# Patient Record
Sex: FEMALE | Race: WHITE | NOT HISPANIC OR LATINO | Employment: OTHER | URBAN - METROPOLITAN AREA
[De-identification: names, ages, dates, MRNs, and addresses within clinical notes are randomized per-mention and may not be internally consistent; named-entity substitution may affect disease eponyms.]

---

## 2017-04-01 ENCOUNTER — OFFICE VISIT - RIVER FALLS (OUTPATIENT)
Dept: FAMILY MEDICINE | Facility: CLINIC | Age: 72
End: 2017-04-01

## 2017-04-03 ENCOUNTER — OFFICE VISIT - RIVER FALLS (OUTPATIENT)
Dept: FAMILY MEDICINE | Facility: CLINIC | Age: 72
End: 2017-04-03

## 2017-04-03 ASSESSMENT — MIFFLIN-ST. JEOR: SCORE: 1152.67

## 2017-04-12 ENCOUNTER — OFFICE VISIT - RIVER FALLS (OUTPATIENT)
Dept: FAMILY MEDICINE | Facility: CLINIC | Age: 72
End: 2017-04-12

## 2017-04-12 ASSESSMENT — MIFFLIN-ST. JEOR: SCORE: 1151.77

## 2017-04-26 ENCOUNTER — OFFICE VISIT - RIVER FALLS (OUTPATIENT)
Dept: FAMILY MEDICINE | Facility: CLINIC | Age: 72
End: 2017-04-26

## 2017-04-26 ASSESSMENT — MIFFLIN-ST. JEOR: SCORE: 1151.77

## 2017-05-15 ENCOUNTER — OFFICE VISIT - RIVER FALLS (OUTPATIENT)
Dept: FAMILY MEDICINE | Facility: CLINIC | Age: 72
End: 2017-05-15

## 2017-05-15 ASSESSMENT — MIFFLIN-ST. JEOR: SCORE: 1154.71

## 2017-09-19 ENCOUNTER — OFFICE VISIT - RIVER FALLS (OUTPATIENT)
Dept: FAMILY MEDICINE | Facility: CLINIC | Age: 72
End: 2017-09-19

## 2017-09-19 ASSESSMENT — MIFFLIN-ST. JEOR: SCORE: 1173.76

## 2017-09-20 LAB
CREAT SERPL-MCNC: 0.91 MG/DL (ref 0.6–0.93)
GLUCOSE BLD-MCNC: 107 MG/DL (ref 65–99)

## 2017-10-09 ENCOUNTER — OFFICE VISIT - RIVER FALLS (OUTPATIENT)
Dept: FAMILY MEDICINE | Facility: CLINIC | Age: 72
End: 2017-10-09

## 2017-10-09 ASSESSMENT — MIFFLIN-ST. JEOR: SCORE: 1177.39

## 2018-10-11 ENCOUNTER — OFFICE VISIT - RIVER FALLS (OUTPATIENT)
Dept: FAMILY MEDICINE | Facility: CLINIC | Age: 73
End: 2018-10-11

## 2018-10-11 ASSESSMENT — MIFFLIN-ST. JEOR: SCORE: 1163.78

## 2018-10-19 ENCOUNTER — OFFICE VISIT - RIVER FALLS (OUTPATIENT)
Dept: FAMILY MEDICINE | Facility: CLINIC | Age: 73
End: 2018-10-19

## 2018-10-19 ASSESSMENT — MIFFLIN-ST. JEOR: SCORE: 1165.6

## 2019-02-19 ENCOUNTER — OFFICE VISIT - RIVER FALLS (OUTPATIENT)
Dept: FAMILY MEDICINE | Facility: CLINIC | Age: 74
End: 2019-02-19

## 2019-02-19 ASSESSMENT — MIFFLIN-ST. JEOR: SCORE: 1187.37

## 2019-11-05 ENCOUNTER — OFFICE VISIT - RIVER FALLS (OUTPATIENT)
Dept: FAMILY MEDICINE | Facility: CLINIC | Age: 74
End: 2019-11-05

## 2019-11-05 ASSESSMENT — MIFFLIN-ST. JEOR: SCORE: 1178.07

## 2019-11-06 ENCOUNTER — COMMUNICATION - RIVER FALLS (OUTPATIENT)
Dept: FAMILY MEDICINE | Facility: CLINIC | Age: 74
End: 2019-11-06

## 2019-11-06 LAB — GLUCOSE BLD-MCNC: 96 MG/DL (ref 65–139)

## 2019-11-12 ENCOUNTER — OFFICE VISIT - RIVER FALLS (OUTPATIENT)
Dept: FAMILY MEDICINE | Facility: CLINIC | Age: 74
End: 2019-11-12

## 2020-03-25 ENCOUNTER — COMMUNICATION - RIVER FALLS (OUTPATIENT)
Dept: FAMILY MEDICINE | Facility: CLINIC | Age: 75
End: 2020-03-25

## 2020-05-05 ENCOUNTER — AMBULATORY - RIVER FALLS (OUTPATIENT)
Dept: FAMILY MEDICINE | Facility: CLINIC | Age: 75
End: 2020-05-05

## 2020-05-05 ENCOUNTER — OFFICE VISIT - RIVER FALLS (OUTPATIENT)
Dept: FAMILY MEDICINE | Facility: CLINIC | Age: 75
End: 2020-05-05

## 2020-05-05 ASSESSMENT — MIFFLIN-ST. JEOR: SCORE: 1181.7

## 2020-05-06 LAB
BUN SERPL-MCNC: 20 MG/DL (ref 7–25)
BUN/CREAT RATIO - HISTORICAL: ABNORMAL (ref 6–22)
C REACTIVE PROTEIN LHE: 7.3 MG/L
CALCIUM SERPL-MCNC: 9.8 MG/DL (ref 8.6–10.4)
CHLORIDE BLD-SCNC: 103 MMOL/L (ref 98–110)
CK SERPL-CCNC: 64 UNIT/L (ref 29–143)
CO2 SERPL-SCNC: 29 MMOL/L (ref 20–32)
CREAT SERPL-MCNC: 0.87 MG/DL (ref 0.6–0.93)
EGFRCR SERPLBLD CKD-EPI 2021: 66 ML/MIN/1.73M2
ERYTHROCYTE [DISTWIDTH] IN BLOOD BY AUTOMATED COUNT: 12.5 % (ref 11–15)
ERYTHROCYTE [SEDIMENTATION RATE] IN BLOOD BY WESTERGREN METHOD: 14 MM/H
GLUCOSE BLD-MCNC: 102 MG/DL (ref 65–99)
HCT VFR BLD AUTO: 37.2 % (ref 35–45)
HGB BLD-MCNC: 13 GM/DL (ref 11.7–15.5)
MCH RBC QN AUTO: 30.9 PG (ref 27–33)
MCHC RBC AUTO-ENTMCNC: 34.9 GM/DL (ref 32–36)
MCV RBC AUTO: 88.4 FL (ref 80–100)
PLATELET # BLD AUTO: 298 10*3/UL (ref 140–400)
PMV BLD: 10.1 FL (ref 7.5–12.5)
POTASSIUM BLD-SCNC: 4 MMOL/L (ref 3.5–5.3)
RBC # BLD AUTO: 4.21 10*6/UL (ref 3.8–5.1)
SODIUM SERPL-SCNC: 138 MMOL/L (ref 135–146)
TSH SERPL DL<=0.005 MIU/L-ACNC: 2.36 MIU/L (ref 0.4–4.5)
WBC # BLD AUTO: 5.8 10*3/UL (ref 3.8–10.8)

## 2020-05-12 ENCOUNTER — COMMUNICATION - RIVER FALLS (OUTPATIENT)
Dept: FAMILY MEDICINE | Facility: CLINIC | Age: 75
End: 2020-05-12

## 2020-05-22 ENCOUNTER — COMMUNICATION - RIVER FALLS (OUTPATIENT)
Dept: FAMILY MEDICINE | Facility: CLINIC | Age: 75
End: 2020-05-22

## 2020-05-28 ENCOUNTER — AMBULATORY - RIVER FALLS (OUTPATIENT)
Dept: FAMILY MEDICINE | Facility: CLINIC | Age: 75
End: 2020-05-28

## 2020-05-29 ENCOUNTER — COMMUNICATION - RIVER FALLS (OUTPATIENT)
Dept: FAMILY MEDICINE | Facility: CLINIC | Age: 75
End: 2020-05-29

## 2020-05-29 LAB — ERYTHROCYTE [SEDIMENTATION RATE] IN BLOOD BY WESTERGREN METHOD: 9 MM/H

## 2020-06-16 ENCOUNTER — COMMUNICATION - RIVER FALLS (OUTPATIENT)
Dept: FAMILY MEDICINE | Facility: CLINIC | Age: 75
End: 2020-06-16

## 2020-06-16 ENCOUNTER — OFFICE VISIT - RIVER FALLS (OUTPATIENT)
Dept: FAMILY MEDICINE | Facility: CLINIC | Age: 75
End: 2020-06-16

## 2020-06-18 ENCOUNTER — OFFICE VISIT - RIVER FALLS (OUTPATIENT)
Dept: FAMILY MEDICINE | Facility: CLINIC | Age: 75
End: 2020-06-18

## 2020-06-18 ASSESSMENT — MIFFLIN-ST. JEOR: SCORE: 1178.07

## 2020-06-20 LAB
A/G RATIO - HISTORICAL: 1.6 (ref 1–2.5)
ALBUMIN SERPL-MCNC: 4.1 GM/DL (ref 3.6–5.1)
ALP SERPL-CCNC: 117 UNIT/L (ref 37–153)
ALT SERPL W P-5'-P-CCNC: 13 UNIT/L (ref 6–29)
ANA SER QL IA: NEGATIVE
AST SERPL W P-5'-P-CCNC: 16 UNIT/L (ref 10–35)
B BURGDOR IGG+IGM SER QL: <0.9
BASOPHILS # BLD MANUAL: 58 10*3/UL (ref 0–200)
BASOPHILS NFR BLD MANUAL: 1.1 %
BILIRUB SERPL-MCNC: 0.6 MG/DL (ref 0.2–1.2)
BUN SERPL-MCNC: 19 MG/DL (ref 7–25)
BUN/CREAT RATIO - HISTORICAL: NORMAL (ref 6–22)
CALCIUM SERPL-MCNC: 10.2 MG/DL (ref 8.6–10.4)
CHLORIDE BLD-SCNC: 103 MMOL/L (ref 98–110)
CO2 SERPL-SCNC: 29 MMOL/L (ref 20–32)
CREAT SERPL-MCNC: 0.78 MG/DL (ref 0.6–0.93)
CYCLIC CITRULLINATED PEPTIDE CCP AB IGG - QUEST: <16
EGFRCR SERPLBLD CKD-EPI 2021: 75 ML/MIN/1.73M2
EOSINOPHIL # BLD MANUAL: 122 10*3/UL (ref 15–500)
EOSINOPHIL NFR BLD MANUAL: 2.3 %
ERYTHROCYTE [DISTWIDTH] IN BLOOD BY AUTOMATED COUNT: 12.9 % (ref 11–15)
GLOBULIN: 2.6 (ref 1.9–3.7)
GLUCOSE BLD-MCNC: 92 MG/DL (ref 65–99)
HCT VFR BLD AUTO: 42.4 % (ref 35–45)
HGB BLD-MCNC: 14.6 GM/DL (ref 11.7–15.5)
LYMPHOCYTES # BLD MANUAL: 1362 10*3/UL (ref 850–3900)
LYMPHOCYTES NFR BLD MANUAL: 25.7 %
MCH RBC QN AUTO: 31.1 PG (ref 27–33)
MCHC RBC AUTO-ENTMCNC: 34.4 GM/DL (ref 32–36)
MCV RBC AUTO: 90.2 FL (ref 80–100)
MONOCYTES # BLD MANUAL: 631 10*3/UL (ref 200–950)
MONOCYTES NFR BLD MANUAL: 11.9 %
NEUTROPHILS # BLD MANUAL: 3127 10*3/UL (ref 1500–7800)
NEUTROPHILS NFR BLD MANUAL: 59 %
PLATELET # BLD AUTO: 264 10*3/UL (ref 140–400)
PMV BLD: 10.3 FL (ref 7.5–12.5)
POTASSIUM BLD-SCNC: 4 MMOL/L (ref 3.5–5.3)
PROT SERPL-MCNC: 6.7 GM/DL (ref 6.1–8.1)
RBC # BLD AUTO: 4.7 10*6/UL (ref 3.8–5.1)
RHEUMATOID FACT SER NEPH-ACNC: <14 IU/ML
SODIUM SERPL-SCNC: 140 MMOL/L (ref 135–146)
WBC # BLD AUTO: 5.3 10*3/UL (ref 3.8–10.8)

## 2020-06-24 ENCOUNTER — COMMUNICATION - RIVER FALLS (OUTPATIENT)
Dept: FAMILY MEDICINE | Facility: CLINIC | Age: 75
End: 2020-06-24

## 2020-06-25 ENCOUNTER — COMMUNICATION - RIVER FALLS (OUTPATIENT)
Dept: FAMILY MEDICINE | Facility: CLINIC | Age: 75
End: 2020-06-25

## 2020-07-01 ENCOUNTER — COMMUNICATION - RIVER FALLS (OUTPATIENT)
Dept: FAMILY MEDICINE | Facility: CLINIC | Age: 75
End: 2020-07-01

## 2020-08-11 ENCOUNTER — COMMUNICATION - RIVER FALLS (OUTPATIENT)
Dept: FAMILY MEDICINE | Facility: CLINIC | Age: 75
End: 2020-08-11

## 2020-11-12 ENCOUNTER — OFFICE VISIT - RIVER FALLS (OUTPATIENT)
Dept: FAMILY MEDICINE | Facility: CLINIC | Age: 75
End: 2020-11-12

## 2021-11-15 ENCOUNTER — TRANSFERRED RECORDS (OUTPATIENT)
Dept: MULTI SPECIALTY CLINIC | Facility: CLINIC | Age: 76
End: 2021-11-15

## 2021-11-15 ENCOUNTER — OFFICE VISIT - RIVER FALLS (OUTPATIENT)
Dept: FAMILY MEDICINE | Facility: CLINIC | Age: 76
End: 2021-11-15

## 2021-11-15 ASSESSMENT — MIFFLIN-ST. JEOR: SCORE: 1178.07

## 2022-02-11 VITALS
SYSTOLIC BLOOD PRESSURE: 128 MMHG | DIASTOLIC BLOOD PRESSURE: 64 MMHG | BODY MASS INDEX: 24.4 KG/M2 | WEIGHT: 151.8 LBS | TEMPERATURE: 98.4 F | HEIGHT: 66 IN | HEART RATE: 68 BPM

## 2022-02-12 VITALS
TEMPERATURE: 98.4 F | BODY MASS INDEX: 24.02 KG/M2 | DIASTOLIC BLOOD PRESSURE: 70 MMHG | SYSTOLIC BLOOD PRESSURE: 160 MMHG | OXYGEN SATURATION: 97 % | HEIGHT: 66 IN | BODY MASS INDEX: 23.78 KG/M2 | HEART RATE: 70 BPM | WEIGHT: 148 LBS | HEIGHT: 66 IN

## 2022-02-12 VITALS
TEMPERATURE: 98.2 F | OXYGEN SATURATION: 99 % | RESPIRATION RATE: 16 BRPM | BODY MASS INDEX: 24.16 KG/M2 | SYSTOLIC BLOOD PRESSURE: 136 MMHG | WEIGHT: 144.6 LBS | HEART RATE: 76 BPM | HEART RATE: 70 BPM | DIASTOLIC BLOOD PRESSURE: 80 MMHG | HEIGHT: 65 IN | HEART RATE: 65 BPM | WEIGHT: 145 LBS | HEIGHT: 65 IN | DIASTOLIC BLOOD PRESSURE: 70 MMHG | TEMPERATURE: 97.2 F | TEMPERATURE: 98.7 F | TEMPERATURE: 98 F | SYSTOLIC BLOOD PRESSURE: 106 MMHG | OXYGEN SATURATION: 98 % | DIASTOLIC BLOOD PRESSURE: 78 MMHG | HEART RATE: 73 BPM | DIASTOLIC BLOOD PRESSURE: 76 MMHG | OXYGEN SATURATION: 99 % | HEIGHT: 65 IN | SYSTOLIC BLOOD PRESSURE: 138 MMHG | BODY MASS INDEX: 23.98 KG/M2 | DIASTOLIC BLOOD PRESSURE: 74 MMHG | OXYGEN SATURATION: 98 % | HEIGHT: 66 IN | HEART RATE: 78 BPM | BODY MASS INDEX: 24.19 KG/M2 | BODY MASS INDEX: 24.16 KG/M2 | WEIGHT: 145.2 LBS | BODY MASS INDEX: 23.24 KG/M2 | SYSTOLIC BLOOD PRESSURE: 120 MMHG | TEMPERATURE: 97.9 F | WEIGHT: 145 LBS | WEIGHT: 145 LBS | SYSTOLIC BLOOD PRESSURE: 140 MMHG

## 2022-02-12 VITALS — BODY MASS INDEX: 24.5 KG/M2 | HEIGHT: 66 IN

## 2022-02-12 VITALS
SYSTOLIC BLOOD PRESSURE: 128 MMHG | DIASTOLIC BLOOD PRESSURE: 70 MMHG | HEIGHT: 66 IN | BODY MASS INDEX: 23.78 KG/M2 | TEMPERATURE: 98.4 F | HEART RATE: 68 BPM | WEIGHT: 148 LBS

## 2022-02-12 VITALS
HEART RATE: 76 BPM | DIASTOLIC BLOOD PRESSURE: 66 MMHG | HEIGHT: 66 IN | BODY MASS INDEX: 24.04 KG/M2 | DIASTOLIC BLOOD PRESSURE: 66 MMHG | SYSTOLIC BLOOD PRESSURE: 100 MMHG | BODY MASS INDEX: 23.91 KG/M2 | TEMPERATURE: 98 F | TEMPERATURE: 98.7 F | WEIGHT: 148.8 LBS | HEART RATE: 68 BPM | WEIGHT: 149.6 LBS | HEIGHT: 66 IN | SYSTOLIC BLOOD PRESSURE: 116 MMHG

## 2022-02-12 VITALS
WEIGHT: 146.6 LBS | BODY MASS INDEX: 23.56 KG/M2 | WEIGHT: 147 LBS | HEART RATE: 72 BPM | SYSTOLIC BLOOD PRESSURE: 136 MMHG | DIASTOLIC BLOOD PRESSURE: 86 MMHG | TEMPERATURE: 99.1 F | HEIGHT: 66 IN | SYSTOLIC BLOOD PRESSURE: 135 MMHG | HEART RATE: 69 BPM | HEIGHT: 66 IN | BODY MASS INDEX: 23.63 KG/M2 | DIASTOLIC BLOOD PRESSURE: 78 MMHG

## 2022-02-12 VITALS
HEIGHT: 66 IN | HEIGHT: 66 IN | SYSTOLIC BLOOD PRESSURE: 126 MMHG | WEIGHT: 148.8 LBS | DIASTOLIC BLOOD PRESSURE: 74 MMHG | HEART RATE: 69 BPM | BODY MASS INDEX: 23.91 KG/M2 | BODY MASS INDEX: 23.89 KG/M2

## 2022-02-12 VITALS
WEIGHT: 148 LBS | HEART RATE: 76 BPM | HEIGHT: 66 IN | TEMPERATURE: 97.6 F | DIASTOLIC BLOOD PRESSURE: 74 MMHG | BODY MASS INDEX: 23.78 KG/M2 | SYSTOLIC BLOOD PRESSURE: 118 MMHG

## 2022-02-16 NOTE — PROGRESS NOTES
Patient:   PETEY AZAR            MRN: [id number]            FIN: [id number]               Age:   71 years     Sex:  Female     :  1945   Associated Diagnoses:   Preoperative clearance; Left inguinal hernia   Author:   Josh Silverman MD      Chief Complaint   2017 1:12 PM CDT    Patient presents today for a pre-op Px for a hernia repair surgery to be done 2017 with Dr. Larios @ [facility].      Preoperative Information   Indication for surgery:  symptomatic inguinal hernia.    Accompanied by:  No one.    Source of history:  Self, Medical record.           Review of Systems   Constitutional:  No fever, No chills, No sweats, No weakness, No fatigue.    Eye:  No recent visual problem.    Ear/Nose/Mouth/Throat:  No decreased hearing, No nasal congestion, No sore throat.    Respiratory:  No shortness of breath, No cough.    Cardiovascular:  Negative, No chest pain, No palpitations, No peripheral edema.    Gastrointestinal:  No nausea, No vomiting, No diarrhea, No constipation, No heartburn.    Genitourinary:  No dysuria, No change in urine stream.    Hematology/Lymphatics:  No bruising tendency, No bleeding tendency.    Endocrine:  No cold intolerance, No heat intolerance.    Immunologic:  Negative.    Musculoskeletal:  No back pain, No neck pain, No joint pain, No muscle pain.    Integumentary:  No rash, No dryness, No skin lesion.    Neurologic:  Alert and oriented X4, No headache.    Psychiatric:  No anxiety, No depression.       Health Status   Allergies:    Allergic Reactions (Selected)  Severity Not Documented  Adhesive tape (Rash)  Cipro (Joint pain)  Doxycycline (Systemic rxn)  Dust (No reactions were documented)  Mold (No reactions were documented)  Pollen (No reactions were documented)  Strawberries (No reactions were documented)  Sulfa drug (Rash and difficulty breathing)   Medications:  (Selected)   Prescriptions  Prescribed  Advair Diskus 100 mcg-50 mcg inhalation powder: 1 puff(s), inh,  bid, # 60 EA, 11 Refill(s), Type: Maintenance, Pharmacy: [facility], 1 puff(s) inh bid  albuterol CFC free 90 mcg/inh inhalation aerosol: 2 puff(s), inh, q4 hrs, # 1 EA, 11 Refill(s), Type: Maintenance, Pharmacy: [facility], 2 puff(s) inh q4 hrs  compression stockings: compression stockings, See Instructions, Instructions: 30-40mm Hg  459.81  chronic venous insuffiency  prevents leg pain and swelling, # 2 EA, 5 Refill(s), Type: Soft Stop  fluticasone 50 mcg/inh nasal spray: 2 spray(s), nasal, daily, # 1 EA, 11 Refill(s), Pharmacy: [facility], 2 spray(s) nasal daily  Documented Medications  Documented  Fish Oil: po, 0 Refill(s), Type: Maintenance  MiraLax: ( 17 gm ), po, daily, 0 Refill(s), Type: Maintenance  Multiple Vitamins oral tablet: 1 tab(s), PO, daily, 0 Refill(s), Type: Soft Stop  PreserVision AREDS 2: 1 cap(s), po, daily, 0 Refill(s), Type: Maintenance   Problem list:    All Problems  Chronic Venous Insufficiency / ICD-9-.81 / Confirmed  Asthma / SNOMED CT 5232585902 / Confirmed  Mild intermittent asthma / SNOMED CT 4884879683 / Confirmed  Osteopenia of the Elderly / ICD-9-.90 / Confirmed  Osteoarthritis of CMC joint of thumb / SNOMED CT 458070510 / Confirmed  Seasonal Allergies / ICD-9-.9 / Confirmed  Resolved: Pregnancy / SNOMED CT 546907528  Resolved: Pregnancy / SNOMED CT 124233035  Resolved: Pregnancy / SNOMED CT 889036496  Resolved: Chicken Pox / ICD-9-.9  Resolved: Tobacco abuse / SNOMED CT 208321786      Histories   Past Medical History:    Active  Chronic Venous Insufficiency (459.81): Onset on 6/25/2012 at 66 years.  Asthma (7624451786)  Osteopenia of the Elderly (733.90)  Seasonal Allergies (477.9)  Resolved  Tobacco abuse (814156627):  Resolved.  Comments:  5/3/2010 CDT 9:31 AM CDT - Doni VILLASENORMira  quit  Pregnancy (210795839):  Resolved in 1972 at 26 years.  Pregnancy (787376634):  Resolved in 1974 at 28  years.  Pregnancy (980642549):  Resolved in  at 34 years.  Chicken Pox (052.9):  Resolved.   Family History:    Dementia  Mother ()  Kidney failure...  Father ()  Sarcoidosis...  Mother ()  Crohns disease...  Daughter (Milady)     Procedure history:    Colonoscopy (SNOMED CT 699399614) on 10/21/2016 at 70 Years.  Comments:  10/24/2016 2:22 PM - Brittany Singh MA  Indication:   screening  Sedation:   Midazolam 3mg, Fentanyl 150mcg--IV  Findings:  no polyps/abnormalities seen  Recommendation:   f/u only as needed.  Cholecystectomy with cholangiography (SNOMED CT 261114447) on 2013 at 67 Years.  Colonoscopy (SNOMED CT 739109938) on 2006 at 60 Years.  Comments:  2013 3:31 PM - Talia Llanes CMA  Procedure done in [place].  Repeat in 3 years with yearly hemoccults.    2012 9:28 AM - Brittany Singh MA  diverticulosis.    rpt 2009  Knee replacement (SNOMED CT 758388633).  venous ligation.  Comments:  2012 4:52 PM - Brittany Singh MA  both legs.   Social History:        Alcohol Assessment            Current, Wine (5 oz), Daily, 1 drinks/episode average.      Tobacco Assessment            Past      Substance Abuse Assessment            Never      Employment and Education Assessment            Retired      Home and Environment Assessment            Marital status: .  Spouse/Partner name: Tomy.  Lives with Spouse.  3 children.      Nutrition and Health Assessment            Type of diet: Regular.      Exercise and Physical Activity Assessment: Regular exercise            Exercise frequency: 3-4 times/week.  Exercise type: Aerobics, Yoga.      Sexual Assessment            Sexually active: No.       Has no history of anemia.  Has no history of DVT or pulmonary embolism.  Has no personal history of bleeding problems.   Has no personal or family history of anesthesia reactions.  Patient does not have active tuberculosis.    S/he has not taken aspirin or aspirin  containing products in the last week.     S/he has not taken Plavix (Clopidogrel) in the last 2 weeks.    S/he has not taken warfarin in the past week.    S/he has not been on corticosteroids for more than 2 weeks recently.      S/he is not DNR before, during or after surgery.    Chest pain / SOB walking up 2 flights of steps:  no  Pain in neck or jaw:  no  CAD MI:  no  Afib:  no  Heart Failure:  no  Asthma  or Bronchitis:  yes  Diabetes:  no       Insulin/Orals   Seizure Disorder:  no  CKD:  no  Thyroid Disease:  no  Liver Disease:  no  CVA:  no         Physical Examination   Vital Signs   4/12/2017 1:12 PM CDT Temperature Tympanic 98.0 DegF    Peripheral Pulse Rate 65 bpm    HR Method Electronic    Systolic Blood Pressure 106 mmHg    Diastolic Blood Pressure 70 mmHg    Mean Arterial Pressure 82 mmHg    BP Site Right arm    BP Method Manual    Oxygen Saturation 99 %      Measurements from flowsheet : Measurements   4/12/2017 1:12 PM CDT Height Measured - Standard 65.2 in    Weight Measured - Standard 145 lb    BSA 1.74 m2    Body Mass Index 23.98 kg/m2      General:  Alert and oriented, No acute distress.    Eye:  Pupils are equal, round and reactive to light, Extraocular movements are intact, Normal conjunctiva.    HENT:  Normocephalic, Tympanic membranes are clear, Oral mucosa is moist, No pharyngeal erythema, No sinus tenderness.    Neck:  Supple, Non-tender, No carotid bruit, No lymphadenopathy, No thyromegaly.    Respiratory:  Lungs are clear to auscultation, Respirations are non-labored, Breath sounds are equal, No chest wall tenderness.    Cardiovascular:  Normal rate, Regular rhythm, No murmur, No gallop, Normal peripheral perfusion, No edema.    Gastrointestinal:  Soft, Non-tender, No organomegaly.    Musculoskeletal:  Normal range of motion, Normal strength, No swelling, No deformity.    Integumentary:  Warm, Dry, No rash.    Neurologic:  Alert, Oriented, No focal deficits.    Psychiatric:  Cooperative,  Appropriate mood & affect.       Review / Management         Results review:  Lab results   4/1/2017 9:28 AM CDT Sodium Level 140 mmol/L    Potassium Level 3.4 mmol/L    Chloride Level 99 mmol/L    CO2 Level 22 mmol/L    AGAP 19    Glucose Level 136 mg/dL    BUN 22 mg/dL    Creatinine Level 0.84 mg/dL    BUN/Creat Ratio 26    eGFR  >60    eGFR Non-African American >60    Calcium Level 10.4 mg/dL    Bilirubin Total 0.8 mg/dL    Bilirubin Direct 0.3 mg/dL    Bilirubin Indirect 0.5 mg/dL    Alkaline Phosphatase 115 IU/L    AST/SGOT 22 IU/L    ALT/SGPT 21 IU/L    Protein Total 7.8 g/dL    Albumin Level 4.6 g/dL    Globulin 3.2 g/dL    A/G Ratio 1.4    WBC 9.8    RBC 5.19    Hgb 15.8 g/dL    Hct 44.9 %    MCV 87 fL    MCH 30.4 pg    MCHC 35.2 g/dL    RDW 12.7 %    Platelet 270    MPV 9.7 fL   .    ECG interpretation:  Within normal limits, Normal sinus rhythm, No ST-T changes.       Impression and Plan   Diagnosis     Preoperative clearance (OGW01-DM Z01.818).     Left inguinal hernia (TKP26-TS K40.90).     Condition:  Stable, The patient is cleared for anesthesia and surgery.

## 2022-02-16 NOTE — PROGRESS NOTES
Patient:   PETEY AZAR            MRN: [id number]            FIN: [id number]               Age:   71 years     Sex:  Female     :  1945   Associated Diagnoses:   Medicare annual wellness visit, subsequent; AK (actinic keratosis); Mild intermittent asthma   Author:   Josh Silverman MD      Visit Information      Date of Service: 10/09/2017 10:49 am  Performing Location: [facility]  Encounter#: [id number]      Primary Care Provider (PCP):  Josh Silverman MD    NPI# [id number]   Visit type:  Medicare, annual wellness visit.    Accompanied by:  No one.    Source of history:  Self.    History limitation:  None.       Chief Complaint   10/9/2017 10:51 AM CDT   AWV, flu shot.        Well Adult History   Well Adult History             The patient presents for Medicare well exam.  The patient's general health status is described as good.  The patient's diet is described as balanced.  Exercise: occasional.  Associated symptoms consist of none.  Last menstrual period: menopausal.  Medical encounters:.  Complaint:.  Additional pertinent history: daily caffeine use, tobacco use none and occasional alcohol use.       mammogram: 16  Pap smear:  n/a  colonoscopy:  up to date   lipid and diabetes screening:    immunizations:  up to date   other:  needs update on asthma, may be travelling to [place] with the [place]      Review of Systems   Constitutional:  No fever, No chills, No sweats, No weakness, No fatigue.    Eye:  No recent visual problem.    Ear/Nose/Mouth/Throat:  No decreased hearing, No nasal congestion, No sore throat.    Respiratory:  No shortness of breath, No cough.    Cardiovascular:  Negative, No chest pain, No palpitations, No peripheral edema.    Gastrointestinal:  No nausea, No vomiting, No diarrhea, No constipation, No heartburn.    Genitourinary:  No dysuria, No change in urine stream.    Hematology/Lymphatics:  No bruising tendency, No bleeding tendency.    Endocrine:   No cold intolerance, No heat intolerance.    Immunologic:  Negative.    Musculoskeletal:  No back pain, No neck pain, No joint pain, No muscle pain.    Integumentary:  Rash, No dryness, No skin lesion.    Neurologic:  Alert and oriented X4,   Cognitive impairment: none  Year: [place]  Date:  [place]  [place]    Psychiatric:  No anxiety, No depression.    PHQ9 and CAGE reviewed and discussed with patient.      Hearing impairment:  no  ADL: independent  Fall risk:  low  Home safety:  [place]    Advanced care planning:  completed      Health Status   Allergies:    Allergic Reactions (Selected)  Severity Not Documented  Adhesive tape (Rash)  Cipro (Joint pain)  Doxycycline (Systemic rxn)  Dust (No reactions were documented)  Mold (No reactions were documented)  Pollen (No reactions were documented)  Strawberries (No reactions were documented)  Sulfa drug (Rash and difficulty breathing)   Medications:  (Selected)   Prescriptions  Prescribed  Advair Diskus 100 mcg-50 mcg inhalation powder: 1 puff(s), inh, bid, # 1 EA, 3 Refill(s), Type: Soft Stop, Pharmacy: [facility], 1 puff(s) inh bid  ProAir HFA 90 mcg/inh inhalation aerosol: See Instructions, Instructions: INHALE 2 PUFFS BY MOUTH EVERY 4 HOURS, # 8.5 unknown unit, Type: Soft Stop, Pharmacy: [facility], INHALE 2 PUFFS BY MOUTH EVERY 4 HOURS  compression stockings: compression stockings, See Instructions, Instructions: 30-40mm Hg  459.81  chronic venous insuffiency  prevents leg pain and swelling, # 2 EA, 5 Refill(s), Type: Soft Stop  fluticasone 50 mcg/inh nasal spray: 2 spray(s), nasal, daily, # 1 EA, 11 Refill(s), Pharmacy: [facility], 2 spray(s) nasal daily  Documented Medications  Documented  Fish Oil: po, 0 Refill(s), Type: Maintenance  MiraLax: ( 17 gm ), po, daily, 0 Refill(s), Type: Maintenance  Multiple Vitamins oral tablet: 1 tab(s), PO, daily, 0 Refill(s), Type: Soft Stop  PreserVision AREDS 2: 1 cap(s), po, daily,  0 Refill(s), Type: Maintenance   Problem list:    All Problems  Chronic Venous Insufficiency / ICD-9-.81 / Confirmed  Asthma / SNOMED CT 4591024853 / Confirmed  Mild intermittent asthma / SNOMED CT 6013987998 / Confirmed  Osteopenia of the Elderly / ICD-9-.90 / Confirmed  Osteoarthritis of CMC joint of thumb / SNOMED CT 541995492 / Confirmed  Seasonal Allergies / ICD-9-.9 / Confirmed  Resolved: Pregnancy / SNOMED CT 528920734  Resolved: Pregnancy / SNOMED CT 322932381  Resolved: Pregnancy / SNOMED CT 224952112  Resolved: Chicken Pox / ICD-9-.9  Resolved: Tobacco abuse / SNOMED CT 440464871     Other Healthcare:         Histories   Past Medical History:    Active  Chronic Venous Insufficiency (459.81): Onset on 2012 at 66 years.  Asthma (2820196793)  Osteopenia of the Elderly (733.90)  Seasonal Allergies (477.9)  Resolved  Tobacco abuse (384570700):  Resolved.  Comments:  5/3/2010 CDT 9:31 AM CDT - Mira Marion CMA  quit  Pregnancy (767734691):  Resolved in  at 26 years.  Pregnancy (157629761):  Resolved in  at 28 years.  Pregnancy (454698399):  Resolved in  at 34 years.  Chicken Pox (052.9):  Resolved.   Family History:    Dementia  Mother ()  Kidney failure...  Father ()  Sarcoidosis...  Mother ()  Crohns disease...  Daughter (Milady)     Procedure history:    Extracapsular cataract extraction and insertion of intraocular lens (SNOMED CT 627637350) on 2017 at 71 Years.  Comments:  10/6/2017 2:55 PM - Char Sharma.  Colonoscopy (SNOMED CT 408003517) on 10/21/2016 at 70 Years.  Comments:  10/24/2016 2:22 PM - Francisco GOLDBERG, Brittany  Indication:   screening  Sedation:   Midazolam 3mg, Fentanyl 150mcg--IV  Findings:  no polyps/abnormalities seen  Recommendation:   f/u only as needed.  Cholecystectomy with cholangiography (SNOMED CT 857691174) on 2013 at 67 Years.  Colonoscopy (SNOMED CT 704912395) on 2006 at 60  Years.  Comments:  12/19/2013 3:31 PM - Talia Llanes CMA  Procedure done in [place].  Repeat in 3 years with yearly hemoccults.    6/25/2012 9:28 AM - Brittany Singh MA  diverticulosis.    rpt 02/2009  Hernia repair (SNOMED CT 34703132) on 7/1/1994 at 48 Years.  Comments:  6/7/2013 12:42 PM - Brittany Rey  Right femoral hernia  Tonsillectomy (SNOMED CT 738050496).  Breast biopsy and related procedures (SNOMED CT 745042347).  Comments:  5/3/2010 9:28 AM - Mira Wu  benign  cyst removal.  Knee replacement (SNOMED CT 413662354).  venous ligation.  Comments:  6/25/2012 4:52 PM - Brittany Singh MA  both legs.   Social History:        Alcohol Assessment            Current, Wine (5 oz), Daily, 1 drinks/episode average.      Tobacco Assessment            Past      Substance Abuse Assessment            Never      Employment and Education Assessment            Retired      Home and Environment Assessment            Marital status: .  Spouse/Partner name: Tomy.  Lives with Spouse.  3 children.      Nutrition and Health Assessment            Type of diet: Regular.      Exercise and Physical Activity Assessment: Regular exercise            Exercise frequency: 3-4 times/week.  Exercise type: Aerobics, Yoga.      Sexual Assessment            Sexually active: No.        Physical Examination   Vital Signs   10/9/2017 10:51 AM CDT Temperature Tympanic 98 DegF    Peripheral Pulse Rate 68 bpm    Pulse Site Radial artery    HR Method Manual    Systolic Blood Pressure 116 mmHg    Diastolic Blood Pressure 66 mmHg    Mean Arterial Pressure 83 mmHg    BP Site Right arm    BP Method Manual      Measurements from flowsheet : Measurements   10/9/2017 10:51 AM CDT Height Measured - Standard 65.5 in    Weight Measured - Standard 149.6 lb    BSA 1.77 m2    Body Mass Index 24.51 kg/m2      General:  Alert and oriented, No acute distress.    Eye:  Pupils are equal, round and reactive to light, Extraocular movements are intact,  Normal conjunctiva.    HENT:  Normocephalic, Tympanic membranes are clear, Oral mucosa is moist, No pharyngeal erythema, No sinus tenderness.    Neck:  Supple, Non-tender, No carotid bruit, No lymphadenopathy, No thyromegaly.    Respiratory:  Lungs are clear to auscultation, Respirations are non-labored, Breath sounds are equal, No chest wall tenderness.    Cardiovascular:  Normal rate, Regular rhythm, No murmur, No gallop, Normal peripheral perfusion, No edema.    Breast:  No mass, No tenderness, No discharge.    Gastrointestinal:  Soft, Non-tender, Normal bowel sounds, No organomegaly.    Genitourinary:  No costovertebral angle tenderness.    Musculoskeletal:  Normal range of motion, Normal strength, No swelling, No deformity, 3 mm erythematous, irritated lesion on right temple.    Integumentary:  Warm, Dry, No rash.    Neurologic:  Alert, Oriented, Normal sensory, Normal motor function, No focal deficits, Normal deep tendon reflexes.    Psychiatric:  Cooperative, Appropriate mood & affect.       Review / Management   Results review      Impression and Plan   Diagnosis     Medicare annual wellness visit, subsequent (RIK24-ST Z09).     AK (actinic keratosis) (XPY51-EG L57.0).     Mild intermittent asthma (YNW32-MS J45.20).     Course:  Progressing as expected.    Plan:  Counseled on health maintenance, diet, activity, BMI discussed  azithromycin for travel.         Procedure: lesion was treated with a 30 second freeze with ice ball extending 1 mm beyond margin of lesion.    Patient Instructions:       Counseled: Regarding diagnosis, Regarding medications, Smoking cessation, Verbalized understanding, Breast cancer, colon cancer, diabetes, lipid, HTN, obesity screening discussed and initiated, Risk factors for development of chronic disease and infirmities of ageing have been discussed and plans for minimizing and screening for these conditions have been discussed.  Plans in place for management of conditions  identified.  The preventative screening checklist has been completed and reviewed with the patient and a copy has been filled in the electronic record..    Orders     Orders (Selected)   Outpatient Orders  Completed  influenza virus vaccine, inactivated high-dose preservative-free trivalent intramuscular suspension...: 0.5 mL, im, once  Prescriptions  Prescribed  azithromycin 250 mg oral tablet: 1 tab, PO, Daily, x 5 day(s), Instructions: Take 2 today then 1 daily for 4 days, # 6 tab(s), 0 Refill(s), Type: Acute, Pharmacy: [facility] [place], 1 tab po daily,x5 day(s),Instr:Take 2 today then 1 daily for 4 days  fluticasone 50 mcg/inh nasal spray: 2 spray(s), nasal, daily, # 1 EA, 11 Refill(s), Pharmacy: [facility] [place], 2 spray(s) nasal daily.

## 2022-02-16 NOTE — NURSING NOTE
Quick Intake Entered On:  5/5/2020 3:51 PM CDT    Performed On:  5/5/2020 3:47 PM CDT by Lashon Hummel RN               Summary   Chief Complaint :   Recheck BP   Advance Directive :   Yes   Menstrual Status :   Postmenopausal   Height Measured :   66 in(Converted to: 5 ft 6 in, 167.64 cm)    Systolic Blood Pressure :   126 mmHg   Diastolic Blood Pressure :   74 mmHg   Mean Arterial Pressure :   91 mmHg   Peripheral Pulse Rate :   69 bpm   BP Site :   Right arm   Pulse Site :   Brachial artery   BP Method :   Electronic   HR Method :   Electronic   Lashon Hummel RN - 5/5/2020 3:51 PM CDT

## 2022-02-16 NOTE — PROGRESS NOTES
Chief Complaint    [facility]  History of Present Illness      General health status:  good      Diet:  heart healthy      Exercise:  walking      Medical encounters:  none      Dental exam:        Eye exam:  this year      Caffeine use:  daily      Tobacco use:  no      Alcohol use:  daily      Lipid and diabetes screenin      Colon cancer screening:  n/a      Immunizations:  up to date      Other concerns:  bone density      Asthma is well controlled      She has weaned off prednisone for PMR         Review of Systems          Constitutional:  No fever, No chills, No sweats, No weakness, No fatigue.            Eye:  No recent visual problem.            Ear/Nose/Mouth/Throat:  No decreased hearing, No nasal congestion, No sore throat.            Respiratory:  No shortness of breath, No cough.            Cardiovascular:  Peripheral edema, No chest pain, No palpitations.            Gastrointestinal:  No nausea, No vomiting, No diarrhea, No constipation, No heartburn.            Genitourinary:  No dysuria, No change in urine stream.            Hematology/Lymphatics:  No bruising tendency, No bleeding tendency.            Endocrine:  No cold intolerance, No heat intolerance.            Immunologic:  Negative.            Musculoskeletal:  No back pain, No neck pain, No joint pain, No muscle pain.            Integumentary:  Dryness, Skin lesion, No rash.            Neurologic:  Alert and oriented X4, No headache.          Cognitive impairment:  nook          Year:  ok          Date:  ok          City: [place]           Psychiatric:  No anxiety, No depression.                       PHQ9 and CAGE reviewed and discussed with patient.                       Hearing:  passed          Vision:  passed          ADL: no concerns, completely independent          Fall risk: none          Home safety:  no concerns                     Advanced care planning:  completed   Physical Exam   Vitals & Measurements    T: 97.6  F  (Tympanic)  HR: 76 (Peripheral)  BP: 118/74     HT: 66 in  WT: 148 lb  BMI: 23.89           General:  Alert and oriented, No acute distress.            Eye:  Normal conjunctiva, Vision unchanged.            HENT:  Normocephalic, Tympanic membranes are clear, Oral mucosa is moist, No pharyngeal erythema.            Neck:  Supple, Non-tender, No carotid bruit, No lymphadenopathy, No thyromegaly.            Respiratory:  Lungs are clear to auscultation, Respirations are non-labored.            Cardiovascular:  Normal rate, Regular rhythm, Normal peripheral perfusion.            Gastrointestinal:  Soft, Non-tender.            Genitourinary:  No costovertebral angle tenderness.            Musculoskeletal:  Normal range of motion, Normal strength.            Integumentary:  Warm, Dry, No rash.            Neurologic:  Alert, Oriented.            Psychiatric:  Cooperative, Appropriate mood & affect.    Assessment/Plan       1. Medicare annual wellness visit, subsequent (Z09)        Discussed diet, weight management, BMI, activity and exercise        Follow up in one year        Risk factors for development of chronic disease and infirmities of ageing have been discussed and plans for minimizing and screening for these conditions have been discussed.  Plans in place for management of conditions identified.  The preventive services checklist has been reviewed with the patient and a copy has been placed in the electronic record.  Prostate cancer, colon cancer, diabetes, lipid, HTN, obesity screening discussed and initiated                2. Mild intermittent asthma (J45.20)         controlled, continue current medication                3. Osteoporosis (M81.0)         check DEXA, continue current medication         Ordered:          DEXA Scan (Request), Instructions: RFA[facility], Osteoporosis                Orders:         albuterol, 2 puff(s), Inhale, q4 hrs, PRN: as needed for wheezing, # 1 EA, 3 Refill(s), Type: Hard Stop,  [facility], [facility], 11/12/20 11:03:00 CST, Height Measured, 148, lb, 06/18/20 11:17:00 CDT, Weight Measured, (Completed)         albuterol, 2 puff(s), Inhale, q4 hrs, PRN: as needed for wheezing, # 1 EA, 3 Refill(s), Type: Soft Stop, Pharmacy: [facility] 2 puff(s) Inhale q4 hrs,PRN:as needed for wheezing, 66, in, 11/15/21 14:29:00 CST, Height Measured, 148, lb, 11/15..., (Ordered)         alendronate, = 1 tab(s) ( 70 mg ), Oral, qweek, Instructions: with 6-8 oz plain water, at least 30 minutes before first food, beverage, or medication of the day, # 13 tab(s), 3 Refill(s), Type: Maintenance, Pharmacy: [facility]), 1 tab(s) Oral..., (Ordered)         alendronate, = 1 tab(s) ( 70 mg ), Oral, qweek, Instructions: with 6-8 oz plain water, at least 30 minutes before first food, beverage, or medication of the day, # 13 tab(s), 3 Refill(s), Type: Hard Stop, [facility], 66, in, 11/12/20 1..., (Completed)         fluticasone nasal, = 2 spray(s), Nasal, daily, # 3 EA, 3 Refill(s), Type: Maintenance, Pharmacy: [facility]), 2 spray(s) Nasal daily, 66, in, 11/15/21 14:29:00 CST, Height Measured, 148, lb, 11/15/21 14:29:00 CST, Weight Measured, (Ordered)         fluticasone nasal, = 2 spray(s), Nasal, daily, # 3 EA, 3 Refill(s), Type: Hard Stop, [facility], 66, in, 11/12/20 11:03:00 CST, Height Measured, 148, lb, 06/18/20 11:17:00 CDT, Weight Measured, (Completed)         fluticasone-salmeterol, See Instructions, Instructions: INHALE 1 PUFF TWICE A DAY, # 3 EA, 3 Refill(s), Type: Hard Stop, Pharmacy: [facility], 66, in, 11/12/20 11:03:00 CST, Height Measured, 148, lb, 06/18/20 11:17:00 CDT, Weight Measured, (Completed)         fluticasone-salmeterol, See Instructions, Instructions: INHALE 1 PUFF TWICE A DAY, # 3 EA, 3 Refill(s), Type: Maintenance, Pharmacy: [facility] INHALE 1 PUFF TWICE A DAY, 66, in, 11/15/21 14:29:00 CST, Height Measured, 148, lb, 11/15/21 14:29:00 CST,..., (Ordered)         Return to Clinic (Request), RFV: Yearly exam/physical, Return in 1 year         Return to Clinic (Request), RFV: AWV, Return in 1 year  Patient Information     Name:PETEY AZAR      Address:      [address] [place] [id number]     Sex:Female     YOB: 1945     [phone]     Emergency Contact:RELL AZAR     MRN:[id number]     FIN:[id number]     Location:[facility]     Date of Service:11/15/2021      Primary Care Physician:       Josh Silverman MD, [phone]      Attending Physician:       Josh Silverman MD, [phone]  Problem List/Past Medical History    Ongoing     Asthma     Chronic venous insufficiency     Mild intermittent asthma     Osteoarthritis of CMC joint of thumb     Osteoporosis     Seasonal allergies    Historical     Chicken pox     Pregnancy     Pregnancy     Pregnancy     Tobacco abuse       Comments: quit  Procedure/Surgical History     Extracapsular cataract extraction and insertion of intraocular lens (10/05/2017)      Comments: Left..     Extracapsular cataract extraction and insertion of intraocular lens (09/21/2017)      Comments: Right..     Colonoscopy (10/21/2016)      Comments: Indication:   screening      Sedation:   Midazolam 3mg, Fentanyl 150mcg--IV      Findings:  no polyps/abnormalities seen      Recommendation:   f/u only as needed..     Cholecystectomy with cholangiography (06/11/2013)     Colonoscopy (02/08/2006)      Comments: diverticulosis.    rpt 02/2009. Procedure done in [place].  Repeat in 3 years with yearly hemoccults..     Hernia repair (07/01/1994)      Comments: Right femoral hernia.     Breast biopsy and related procedures      Comments: benign.     cyst removal     Knee replacement     Tonsillectomy     venous ligation      Comments: both legs..  Medications     Advair Diskus 100 mcg-50 mcg inhalation powder, See Instructions, 3 refills    alendronate 70 mg oral tablet, 70 mg= 1 tab(s), Oral, qweek, 3 refills    calcium (as carbonate) 600 mg oral tablet, 1200 mg= 2 tab(s), Oral, daily    compression stockings, See Instructions, 5 refills    Fish Oil, Oral    fluticasone 50 mcg/inh nasal spray, 2 spray(s), Nasal, daily, 3 refills    MiraLax, 17 gm, Oral, daily    Multiple Vitamins oral tablet, 1 tab(s), Oral, daily    PreserVision AREDS 2, 1 cap(s), Oral, daily    ProAir HFA 90 mcg/inh inhalation aerosol, 2 puff(s), Inhale, q4 hrs, PRN, 3 refills  Allergies    Cipro (joint pain)    Dust    Mold    Pollen    Strawberries    adhesive tape (Rash)    doxycycline (systemic rxn)    sulfa drug (Difficulty breathing, Rash)  Social History    Smoking Status     Never smoker     Alcohol - Current      Wine (5 oz), Daily, 1 drinks/episode average. Ready to change: No.     Electronic Cigarette/Vaping      Electronic Cigarette Use: Never.     Employment/School      Retired, Highest education level: Master's.     Exercise      Exercise frequency: 3-4 times/week. Exercise type: Aerobics, Yoga.     Home/Environment      Marital status: . Spouse/Partner name: Tomy. Lives with Spouse. 3 children. Living situation: Home/Independent. Injuries/Abuse/Neglect in household: No. Feels unsafe at home: No. Family/Friends available for support: Yes.     Nutrition/Health      Type of diet: Regular. Wants to lose weight: No. Sleeping concerns: No. Feels highly stressed: No.     Sexual      Sexually active: No. Identifies as female     Substance Abuse - Denies Substance Abuse      Never     Tobacco - Past      Quit in 1 982, Cigarettes, 4 per day. Total pack years: 15.  Family History    Alcohol abuse: Brother and Grandparent.    Alcoholism: Father.    Allergic rhinitis: Mother.    Allergy: Mother and Father.    Alzheimer's disease: Mother.    Arthritis: Mother and Father.    Asthma: Mother and  Father.    Breast cancer: Grandfather (P) and Grandmother (P).    CA - Cancer of colon: Father.    Crohns disease...: Daughter.    Dementia: Mother.    Diabetes mellitus: Father.    Heart disease: Grandfather (M).    Kidney failure...: Father.    Sarcoidosis...: Mother.    Sister: History is unknown    Sister: History is unknown  Immunizations       Scheduled Immunizations       Dose Date(s)       influenza virus vaccine, inactivated       10/22/2013, 10/01/2019, 10/21/2013, 09/10/2020, 10/05/2021       pneumococcal (PPSV23)       10/03/2011       SARS-CoV-2 (COVID-19) Moderna-1273       01/19/2021, 02/16/2021, 10/23/2021       tetanus/diphth/pertuss (Tdap) adult/adol       10/09/2017       zoster vaccine, inactivated       04/15/2019, 06/14/2019       Other Immunizations               Hep A       11/01/1998, 07/01/1999       typhoid, inactivated       11/01/1998, 04/27/2009, 02/10/2014, 02/19/2019       yellow fever       11/01/1998       influenza virus vaccine, inactivated       09/20/2010, 10/03/2011, 10/14/2014, 10/19/2015, 09/21/2016, 10/09/2017, 10/11/2018       tetanus/diphth/pertuss (Tdap) adult/adol       04/21/2008       ZOS, shingles       04/27/2009       influenza, H1N1, inactivated       11/03/2009       pneumococcal (PCV13)       10/14/2014

## 2022-02-16 NOTE — LETTER
(Inserted Image. Unable to display)                                                                                                [address] [facility] [address]                                                July 09, 2020Re: PETEY [id number]  [facility] [address] [place] [id number]:  [facility] following [facility] has been referred to your office/practice:  PETEY DOE Appointment:  Appointment is PendingPlease refer to the attached  clinical documentation for a summary of PETEY's care.  Please do not hesitate to contact our office if any additional clinical questions arise.  All relevant records and transition of care documents should be mailed or faxed.Your assistance in providing continuity of care is appreciated. Sincerely, [facility] [place] [place] [address](P) [phone](F) [phone]

## 2022-02-16 NOTE — TELEPHONE ENCOUNTER
---------------------  From: Josh Silverman MD   To: PETEY AZAR    Sent: 5/29/2020 9:37:20 AM CDT  Subject: Patient Message - Results Notification        Your test is in the normal range.  If symptoms return after the current round of prednisone is finished I advise a clinic appointment.    Results:  Date Result Name Value Ref Range   5/28/2020 2:41 PM Sed Rate 9 ( - < OR = 30)

## 2022-02-16 NOTE — TELEPHONE ENCOUNTER
---------------------  From: Venice Heard LPN (Phone Messages Pool ([id number]_[facility]))   To: LILIA Message Pool ([id number]_[place] [facility]);     Sent: 6/16/2020 9:05:16 AM CDT  Subject: FW: Message for Dr. Silverman             ---------------------  From: PETEY AZAR  To: [facility]  Sent: 06/16/2020 08:58 a.m. CDT  Subject: Message for Dr. Silverman  Symptoms have returned after prednisone finished.  As you suggested, i will call for an appointment.---------------------  From: Venice Heard LPN (eRx Pool ([id number]_[facility]))   To: Josh Silverman MD;     Sent: 6/16/2020 9:06:20 AM CDT  Subject: FW: Message for Dr. Silverman     Patient has a video visit with LILIA at 2:00 today.

## 2022-02-16 NOTE — NURSING NOTE
Comprehensive Intake Entered On:  6/16/2020 1:51 PM CDT    Performed On:  6/16/2020 1:44 PM CDT by Little Mcmahon CMA               Summary   Chief Complaint :   Patient c/o ongoing pain in neck and upper arms. Also, hip and thigh pain. States the Prednisone helped. Verbal consent granted for video visit.   Advance Directive :   Yes   Menstrual Status :   Postmenopausal   Height Measured :   66 in(Converted to: 5 ft 6 in, 167.64 cm)    Little Mcmahon CMA - 6/16/2020 1:44 PM CDT   Health Status   Allergies Verified? :   Yes   Medication History Verified? :   Yes   Immunizations Current :   Yes   Medical History Verified? :   Yes   Pre-Visit Planning Status :   Completed   Tobacco Use? :   Never smoker   Little Mcmahon CMA - 6/16/2020 1:44 PM CDT   Consents   Consent for Immunization Exchange :   Consent Granted   Consent for Immunizations to Providers :   Consent Granted   Little Mcmahon CMA - 6/16/2020 1:44 PM CDT   Meds / Allergies   (As Of: 6/16/2020 1:51:33 PM CDT)   Allergies (Active)   adhesive tape  Estimated Onset Date:   Unspecified ; Reactions:   Rash ; Created By:   Brittany Rey; Reaction Status:   Active ; Category:   Drug ; Substance:   adhesive tape ; Type:   Allergy ; Updated By:   Brittany Rey; Reviewed Date:   6/16/2020 1:46 PM CDT      Cipro  Estimated Onset Date:   Unspecified ; Reactions:   joint pain ; Created By:   Brittany Singh MA; Reaction Status:   Active ; Category:   Drug ; Substance:   Cipro ; Type:   Allergy ; Updated By:   Brittany Singh MA; Source:   Patient ; Reviewed Date:   6/16/2020 1:46 PM CDT      doxycycline  Estimated Onset Date:   Unspecified ; Reactions:   systemic rxn ; Created By:   Brittany Singh MA; Reaction Status:   Active ; Category:   Drug ; Substance:   doxycycline ; Type:   Allergy ; Updated By:   Brittany Singh MA; Source:   Patient ; Reviewed Date:   6/16/2020 1:46 PM CDT      Dust  Estimated Onset Date:   Unspecified ; Created By:   Brittany Rey;  Reaction Status:   Active ; Category:   Environment ; Substance:   Dust ; Type:   Allergy ; Updated By:   Brittany Rey; Reviewed Date:   6/16/2020 1:46 PM CDT      Mold  Estimated Onset Date:   Unspecified ; Created By:   Brittany Rey; Reaction Status:   Active ; Category:   Environment ; Substance:   Mold ; Type:   Allergy ; Updated By:   Brittany Rey; Reviewed Date:   6/16/2020 1:46 PM CDT      Pollen  Estimated Onset Date:   Unspecified ; Created By:   Brittany Rey; Reaction Status:   Active ; Category:   Environment ; Substance:   Pollen ; Type:   Allergy ; Updated By:   Brittany Rey; Reviewed Date:   6/16/2020 1:46 PM CDT      Strawberries  Estimated Onset Date:   Unspecified ; Created By:   Brittany Rey; Reaction Status:   Active ; Category:   Food ; Substance:   Strawberries ; Type:   Allergy ; Updated By:   Brittany Rey; Reviewed Date:   6/16/2020 1:46 PM CDT      sulfa drug  Estimated Onset Date:   Unspecified ; Reactions:   Rash, Difficulty breathing ; Created By:   Brittany Rey; Reaction Status:   Active ; Category:   Drug ; Substance:   sulfa drug ; Type:   Allergy ; Updated By:   Brittany Rey; Source:   Paper Chart ; Reviewed Date:   6/16/2020 1:46 PM CDT        Medication List   (As Of: 6/16/2020 1:51:33 PM CDT)   Prescription/Discharge Order    albuterol  :   albuterol ; Status:   Prescribed ; Ordered As Mnemonic:   ProAir HFA 90 mcg/inh inhalation aerosol ; Simple Display Line:   2 puff(s), Inhale, q4 hrs, PRN: as needed for wheezing, 1 EA, 11 Refill(s) ; Ordering Provider:   Josh Silverman MD; Catalog Code:   albuterol ; Order Dt/Tm:   11/5/2019 3:29:53 PM CST          fluticasone-salmeterol  :   fluticasone-salmeterol ; Status:   Prescribed ; Ordered As Mnemonic:   Advair Diskus 100 mcg-50 mcg inhalation powder ; Simple Display Line:   1 puff(s), inh, bid, 1 EA, 11 Refill(s) ; Ordering Provider:   Josh Silverman MD; Catalog Code:   fluticasone-salmeterol ; Order Dt/Tm:    11/5/2019 3:30:00 PM CST          fluticasone nasal  :   fluticasone nasal ; Status:   Prescribed ; Ordered As Mnemonic:   Flonase 50 mcg/inh nasal spray ; Simple Display Line:   100 mcg, 2 spray(s), Nasal, daily, 16 gm, 11 Refill(s) ; Ordering Provider:   Josh Silverman MD; Catalog Code:   fluticasone nasal ; Order Dt/Tm:   11/5/2019 3:29:52 PM CST          Miscellaneous Rx Supply  :   Miscellaneous Rx Supply ; Status:   Prescribed ; Ordered As Mnemonic:   compression stockings ; Simple Display Line:   See Instructions, 30-40mm Hg  459.81  chronic venous insuffiency  prevents leg pain and swelling, 2 EA ; Ordering Provider:   Josh Silverman MD; Catalog Code:   Miscellaneous Rx Supply ; Order Dt/Tm:   6/25/2012 2:41:39 PM CDT            Home Meds    calcium carbonate  :   calcium carbonate ; Status:   Documented ; Ordered As Mnemonic:   calcium (as carbonate) 600 mg oral tablet ; Simple Display Line:   1,200 mg, 2 tab(s), Oral, daily, 0 Refill(s) ; Catalog Code:   calcium carbonate ; Order Dt/Tm:   6/16/2020 1:46:54 PM CDT          multivitamin  :   multivitamin ; Status:   Documented ; Ordered As Mnemonic:   Multiple Vitamins oral tablet ; Simple Display Line:   1 tab(s), PO, daily ; Catalog Code:   multivitamin ; Order Dt/Tm:   6/25/2012 2:16:21 PM CDT          multivitamin with minerals  :   multivitamin with minerals ; Status:   Documented ; Ordered As Mnemonic:   PreserVision AREDS 2 ; Simple Display Line:   1 cap(s), po, daily, 0 Refill(s) ; Catalog Code:   multivitamin with minerals ; Order Dt/Tm:   7/14/2016 9:23:00 AM CDT          omega-3 polyunsaturated fatty acids  :   omega-3 polyunsaturated fatty acids ; Status:   Documented ; Ordered As Mnemonic:   Fish Oil ; Simple Display Line:   po, 0 Refill(s) ; Catalog Code:   omega-3 polyunsaturated fatty acids ; Order Dt/Tm:   9/15/2015 3:07:24 PM CDT          polyethylene glycol 3350  :   polyethylene glycol 3350 ; Status:   Documented ; Ordered As  Mnemonic:   MiraLax ; Simple Display Line:   17 gm, po, daily ; Catalog Code:   polyethylene glycol 3350 ; Order Dt/Tm:   10/3/2011 11:54:08 AM CDT            ID Risk Screen   Recent Travel History :   No recent travel   Family Member Travel History :   No recent travel   Other Exposure to Infectious Disease :   Unknown   Little Mcmahon CMA - 6/16/2020 1:44 PM CDT

## 2022-02-16 NOTE — PROGRESS NOTES
Patient:   PETEY AZAR            MRN: [id number]            FIN: [id number]               Age:   71 years     Sex:  Female     :  1945   Associated Diagnoses:   Abdominal pain; Nausea   Author:   Emma Argueta      Visit Information      Date of Service: 2017 08:24 am  Performing Location: [facility]  Encounter#: [id number]      Primary Care Provider (PCP):  Josh Silverman MD    NPI# [id number]      Referring Provider:  No referring provider recorded for selected visit.      Chief Complaint   2017 8:37 AM CDT     PT c/o severe abdominal pain started lower area and moving up since last night..  Pt has vomiting        History of Present Illness   Confirmed symptoms and concerns with patient as presented in CC above. Onset of symptoms after supper, she and  had fish and he is ok. No fever. Very nauseated and pain is low mid abdomen. She has not been constipated but took a couple ducolax a few hours ago in case that was the problem. She had a similar episode of this pain about 1 yr with no known etiology. She has has had no fever,no respiratory symptoms, no chest pain or SOB , no pain meds taken. She felt fine yesterday until this episode. No blood in stool, stools have been normal. she did have a screening colonoscopy in the past 3 months and it was landon, no diverticulosis      Health Status   Allergies:    Allergic Reactions (Selected)  Severity Not Documented  Adhesive tape (Rash)  Cipro (Joint pain)  Doxycycline (Systemic rxn)  Dust (No reactions were documented)  Mold (No reactions were documented)  Pollen (No reactions were documented)  Strawberries (No reactions were documented)  Sulfa drug (Rash and difficulty breathing)   Medications:  (Selected)   Prescriptions  Prescribed  Advair Diskus 100 mcg-50 mcg inhalation powder: 1 puff(s), inh, bid, # 60 EA, 11 Refill(s), Type: Maintenance, Pharmacy: [facility] [place], 1 puff(s) inh bid  Zofran 4 mg oral  tablet: 1 tab(s) ( 4 mg ), PO, q8 hrs, Instructions: dispense SL formulation if covered by insurance--pt can choose, # 5 tab(s), 0 Refill(s), Type: Maintenance, Pharmacy: [facility], 1 tab(s) po q8 hrs,Instr:dispense SL formulation if cover...  albuterol CFC free 90 mcg/inh inhalation aerosol: 2 puff(s), inh, q4 hrs, # 1 EA, 11 Refill(s), Type: Maintenance, Pharmacy: [facility], 2 puff(s) inh q4 hrs  compression stockings: compression stockings, See Instructions, Instructions: 30-40mm Hg  459.81  chronic venous insuffiency  prevents leg pain and swelling, # 2 EA, 5 Refill(s), Type: Soft Stop  fluticasone 50 mcg/inh nasal spray: 2 spray(s), nasal, daily, # 1 EA, 11 Refill(s), Pharmacy: [facility], 2 spray(s) nasal daily  ketorolac 10 mg oral tablet: 1 tab(s) ( 10 mg ), PO, q6 hrs, PRN: for pain, # 5 tab(s), 0 Refill(s), Type: Maintenance, Pharmacy: [facility], 1 tab(s) po q6 hrs,PRN:for pain  Documented Medications  Documented  Fish Oil: po, 0 Refill(s), Type: Maintenance  MiraLax: ( 17 gm ), po, daily, 0 Refill(s), Type: Maintenance  Multiple Vitamins oral tablet: 1 tab(s), PO, daily, 0 Refill(s), Type: Soft Stop  PreserVision AREDS 2: 1 cap(s), po, daily, 0 Refill(s), Type: Maintenance  aspirin 81 mg oral tablet: 1 tab(s) ( 81 mg ), po, daily, tab(s), 0 Refill(s), Type: Maintenance   Problem list:    All Problems  Asthma / SNOMED CT 3057846289 / Confirmed  Mild intermittent asthma / SNOMED CT 0902330356 / Confirmed  Osteoarthritis of CMC joint of thumb / SNOMED CT 468274117 / Confirmed  Chronic Venous Insufficiency / ICD-9-.81 / Confirmed  Seasonal Allergies / ICD-9-.9 / Confirmed  Osteopenia of the Elderly / ICD-9-.90 / Confirmed  Resolved: Chicken Pox / ICD-9-.9  Resolved: Tobacco abuse / SNOMED CT 670125421  quit  Resolved: Pregnancy / SNOMED CT 249341366  Resolved: Pregnancy / SNOMED CT 720131700  Resolved:  Pregnancy / SNOMED CT 684997233      Histories   Past Medical History:    Active  Chronic Venous Insufficiency (459.81): Onset on 2012 at 66 years.  Asthma (0545141639)  Osteopenia of the Elderly (733.90)  Seasonal Allergies (477.9)  Resolved  Tobacco abuse (083513735):  Resolved.  Comments:  5/3/2010 CDT 9:31 AM CDT - Mira Marion CMA  quit  Pregnancy (410009351):  Resolved in  at 26 years.  Pregnancy (121081816):  Resolved in  at 28 years.  Pregnancy (124290462):  Resolved in  at 34 years.  Chicken Pox (052.9):  Resolved.   Family History:    Dementia  Mother ()  Kidney failure...  Father ()  Sarcoidosis...  Mother ()  Crohns disease...  Daughter (Milady)     Procedure history:    Colonoscopy (SNOMED CT 386790289) performed by Josh Wheeler MD on 10/21/2016 at 70 Years.  Comments:  10/24/2016 2:22 PM - Brittany Singh MA  Indication:   screening  Sedation:   Midazolam 3mg, Fentanyl 150mcg--IV  Findings:  no polyps/abnormalities seen  Recommendation:   f/u only as needed.  Cholecystectomy with cholangiography (SNOMED CT 187830371) performed by Saul Magallanes on 2013 at 67 Years.  Colonoscopy (SNOMED CT 720640431) on 2006 at 60 Years.  Comments:  2013 3:31 PM - Talia Llanes CMA  Procedure done in [place].  Repeat in 3 years with yearly hemoccults.    2012 9:28 AM - Brittany Singh MA  diverticulosis.    rpt 2009  Hernia repair (SNOMED CT 43748554) on 1994 at 48 Years.  Comments:  2013 12:42 PM - Brittany Rey  Right femoral hernia  Tonsillectomy (SNOMED CT 590986682).  Breast biopsy and related procedures (SNOMED CT 440532216).  Comments:  5/3/2010 9:28 AM - Mira Wu  benign  cyst removal.  Knee replacement (SNOMED CT 181401675).  venous ligation.  Comments:  2012 4:52 PM - Feyereisen MA, Brittany  both legs.   Social History:        Alcohol Assessment            Current, Wine (5 oz), Daily, 1 drinks/episode  average.      Tobacco Assessment            Past      Substance Abuse Assessment            Never      Employment and Education Assessment            Retired      Home and Environment Assessment            Marital status: .  Spouse/Partner name: Tomy.  Lives with Spouse.  3 children.      Nutrition and Health Assessment            Type of diet: Regular.      Exercise and Physical Activity Assessment: Regular exercise            Exercise frequency: 3-4 times/week.  Exercise type: Aerobics, Yoga.      Sexual Assessment            Sexually active: No.        Physical Examination   Vital Signs   4/1/2017 8:37 AM CDT Temperature Tympanic 97.2 DegF  LOW    Peripheral Pulse Rate 78 bpm    HR Method Electronic    Systolic Blood Pressure 136 mmHg    Diastolic Blood Pressure 80 mmHg    Mean Arterial Pressure 99 mmHg    BP Site Right arm    BP Method Manual    Oxygen Saturation 99 %      Measurements from flowsheet : Measurements   4/1/2017 8:37 AM CDT     Weight Measured - Standard                145 lb     General:  Alert and oriented, Mild distress, carrying a bowl in case she vomits.    Eye:  Normal conjunctiva.    HENT:  Tympanic membranes are clear, Normal hearing, Oral mucosa is moist, No pharyngeal erythema, No sinus tenderness.    Neck:  Supple, Non-tender, No lymphadenopathy.    Respiratory:  Lungs are clear to auscultation, Respirations are non-labored, Breath sounds are equal, Symmetrical chest wall expansion.    Cardiovascular:  Normal rate, Regular rhythm, No murmur.    Gastrointestinal:  Soft, Non-distended, Normal bowel sounds, No organomegaly, mild low and left sided tenderness.    Musculoskeletal:  Normal range of motion, Normal gait.    Integumentary:  Warm, Dry, Pink, No rash.    Neurologic:  Alert, Oriented.    Psychiatric:  Cooperative.       Review / Management   Results review:  Lab results   4/1/2017 9:28 AM CDT Sodium Level 140 mmol/L    Potassium Level 3.4 mmol/L    Chloride Level 99 mmol/L     CO2 Level 22 mmol/L    AGAP 19    Glucose Level 136 mg/dL    BUN 22 mg/dL    Creatinine Level 0.84 mg/dL    BUN/Creat Ratio 26    eGFR  >60    eGFR Non-African American >60    Calcium Level 10.4 mg/dL    Bilirubin Total 0.8 mg/dL    Bilirubin Direct 0.3 mg/dL    Bilirubin Indirect 0.5 mg/dL    Alkaline Phosphatase 115 IU/L    AST/SGOT 22 IU/L    ALT/SGPT 21 IU/L    Protein Total 7.8 g/dL    Albumin Level 4.6 g/dL    Globulin 3.2 g/dL    A/G Ratio 1.4    WBC 9.8    RBC 5.19    Hgb 15.8 g/dL    Hct 44.9 %    MCV 87 fL    MCH 30.4 pg    MCHC 35.2 g/dL    RDW 12.7 %    Platelet 270    MPV 9.7 fL   .       Impression and Plan   Diagnosis     Abdominal pain (DAU39-OK R10.9).     Nausea (UZS43-TG R11.0).     Course:  Improving, gien toradol and zofran and felt remarkably improved.    Patient Instructions:       Counseled: Patient, Regarding diagnosis, Regarding treatment, Regarding medications, Verbalized understanding, clear liquids 24 hours, if doing well can slowly advance  If any worsening she should rtc and consider CT scan  She and her  are comfortable with this plan.    Orders     Orders (Selected)   Prescriptions  Prescribed  Zofran 4 mg oral tablet: 1 tab(s) ( 4 mg ), PO, q8 hrs, Instructions: dispense SL formulation if covered by insurance--pt can choose, # 5 tab(s), 0 Refill(s), Type: Maintenance, Pharmacy: [facility] [place],  tab(s) po q8 hrs,Instr:dispense SL formulation if cover...  ketorolac 10 mg oral tablet: 1 tab(s) ( 10 mg ), PO, q6 hrs, PRN: for pain, # 5 tab(s), 0 Refill(s), Type: Maintenance, Pharmacy: [facility] [place], 1 tab(s) po q6 hrs,PRN:for pain.

## 2022-02-16 NOTE — TELEPHONE ENCOUNTER
---------------------  From: Octavio Mobley CMAssica (Phone Messages Pool ([address] [place] [address]))   To: [facility] Message Pool ([address] [place] [address]);     Sent: 8/11/2020 3:24:52 PM CDT  Subject: FW: Rx renew Dr. Herrera Lange           ---------------------  From: PETEY AZAR  To: [facility]  Sent: 08/11/2020 03:23 p.m. CDT  Subject: Rx renew Dr. Felix, Dr. Herrera Felix prescribed 5mg tablets prednisone for my shoulder and hip pain and suggested I see a rheumatologist.  The first appointment i could get is for Nov. 11.  I am taking 7.5 mg daily and it is working fairly well.  I have no refills left, and will need to continue until Nov 11.  I will call the pharmacy to refill and assume that it will be approved unless I hear otherwise.  Thanks very much.  Petey Azar---------------------  From: Suzie Barksdale ([facility] Message Pool ([address] [place] [address]))   To: Jan Felix MD;     Sent: 8/11/2020 4:11:11 PM CDT  Subject: FW: Rx renew Dr. Herrera Lange---------------------  From: Jan Felix MD   To: [facility] Message Pool ([address] [place] [address]);     Sent: 8/11/2020 4:13:57 PM CDT  Subject: RE: Rx renew Dr. Herrera Lange     OK to refill for 4 months.---------------------  From: Little Mcmahon CMA ([facility] Message Pool ([address] [place] [address]))   To: PETEY AZAR    Sent: 8/11/2020 5:07:23 PM CDT  Subject: RE: Rx renew Dr. Herrera Lange,    Your prescription has been approved and sent.    Take care,  Little MCMILLAN, CMA

## 2022-02-16 NOTE — NURSING NOTE
Asthma Control Test (ACT) Total Entered On:  3/25/2020 4:07 PM CDT    Performed On:  3/25/2020 4:07 PM CDT by Brittany Singh MA               Asthma Control Test (ACT) Total   Asthma Control Test Total (Adult) :   18    Brittany Singh MA - 3/25/2020 4:07 PM CDT   Consent (Temporal Branch)/Introductory Paragraph: The rationale for Mohs was explained to the patient and consent was obtained. The risks, benefits and alternatives to therapy were discussed in detail. Specifically, the risks of damage to the temporal branch of the facial nerve, infection, scarring, bleeding, prolonged wound healing, incomplete removal, allergy to anesthesia, and recurrence were addressed. Prior to the procedure, the treatment site was clearly identified and confirmed by the patient. All components of Universal Protocol/PAUSE Rule completed.

## 2022-02-16 NOTE — PROGRESS NOTES
Patient:   PETEY AZAR            MRN: [id number]            FIN: [id number]               Age:   71 years     Sex:  Female     :  1945   Associated Diagnoses:   Acute maxillary sinusitis   Author:   Josh Silverman MD      Visit Information      Date of Service: 05/15/2017 02:25 pm  Performing Location: [facility]  Encounter#: [id number]      Chief Complaint   5/15/2017 2:30 PM CDT    c/o ongoing cold x9days and getting worse.  has loose cough, sinus congestion, thick/yellow nasal discharge.        History of Present Illness             The patient presents with sinus problem.  The sinus problem is located in the maxillary sinus.  The sinus problem is characterized by nasal congestion, rhinorrhea, headache and facial pain.  The sinus problem is constant and is worsening.  The sinus problem has lasted for 9 day(s).  The context of the sinus problem: occurred in association with illness.  Exacerbating factors consist of movement and turning head.  Associated symptoms consist of fever, cough and sore throat.        Review of Systems   All other systems were reviewed and are negative.      Health Status   Allergies:    Allergic Reactions (Selected)  Severity Not Documented  Adhesive tape (Rash)  Cipro (Joint pain)  Doxycycline (Systemic rxn)  Dust (No reactions were documented)  Mold (No reactions were documented)  Pollen (No reactions were documented)  Strawberries (No reactions were documented)  Sulfa drug (Rash and difficulty breathing)   Medications:  (Selected)   Prescriptions  Prescribed  Advair Diskus 100 mcg-50 mcg inhalation powder: 1 puff(s), inh, bid, # 60 EA, 11 Refill(s), Type: Maintenance, Pharmacy: [facility] [place], 1 puff(s) inh bid  albuterol CFC free 90 mcg/inh inhalation aerosol: 2 puff(s), inh, q4 hrs, # 1 EA, 11 Refill(s), Type: Maintenance, Pharmacy: [facility] [place], 2 puff(s) inh q4 hrs  compression stockings: compression stockings, See  Instructions, Instructions: 30-40mm Hg  459.81  chronic venous insuffiency  prevents leg pain and swelling, # 2 EA, 5 Refill(s), Type: Soft Stop  fluticasone 50 mcg/inh nasal spray: 2 spray(s), nasal, daily, # 1 EA, 11 Refill(s), Pharmacy: [facility] [place], 2 spray(s) nasal daily  Documented Medications  Documented  Fish Oil: po, 0 Refill(s), Type: Maintenance  MiraLax: ( 17 gm ), po, daily, 0 Refill(s), Type: Maintenance  Multiple Vitamins oral tablet: 1 tab(s), PO, daily, 0 Refill(s), Type: Soft Stop  PreserVision AREDS 2: 1 cap(s), po, daily, 0 Refill(s), Type: Maintenance   Problem list:    All Problems (Selected)  Chronic Venous Insufficiency / ICD-9-.81 / Confirmed  Asthma / SNOMED CT 8630034168 / Confirmed  Mild intermittent asthma / SNOMED CT 0938078196 / Confirmed  Osteopenia of the Elderly / ICD-9-.90 / Confirmed  Osteoarthritis of CMC joint of thumb / SNOMED CT 156954519 / Confirmed  Seasonal Allergies / ICD-9-.9 / Confirmed      Histories   Past Medical History:    Active  Chronic Venous Insufficiency (459.81): Onset on 6/25/2012 at 66 years.  Asthma (5613382925)  Osteopenia of the Elderly (733.90)  Seasonal Allergies (477.9)  Resolved  Tobacco abuse (187940808):  Resolved.  Comments:  5/3/2010 CDT 9:31 AM CDT - Mira Marion CMA  quit  Pregnancy (111264353):  Resolved in 1972 at 26 years.  Pregnancy (273073008):  Resolved in 1974 at 28 years.  Pregnancy (315724049):  Resolved in 1980 at 34 years.  Chicken Pox (052.9):  Resolved.      Physical Examination   Vital Signs   5/15/2017 2:30 PM CDT Temperature Tympanic 98.2 DegF    Peripheral Pulse Rate 70 bpm    Pulse Site Radial artery    HR Method Electronic    Systolic Blood Pressure 140 mmHg    Diastolic Blood Pressure 76 mmHg    Mean Arterial Pressure 97 mmHg    BP Site Right arm    BP Method Manual    Oxygen Saturation 98 %      Measurements from flowsheet : Measurements   5/15/2017 2:30 PM CDT Height Measured -  Standard 65.5 in    Weight Measured - Standard 144.6 lb    BSA 1.74 m2    Body Mass Index 23.69 kg/m2      General:  Alert and oriented, No acute distress.    Eye:  Normal conjunctiva.    HENT:  Normocephalic.         Sinus: Bilateral, Maxillary sinus, Tenderness.    Neck:  Supple, Non-tender.    Respiratory:  Lungs are clear to auscultation, Respirations are non-labored.    Cardiovascular:  Normal rate, Regular rhythm.    Psychiatric:  Cooperative, Appropriate mood & affect.       Impression and Plan   Diagnosis     Acute maxillary sinusitis (BLO72-UC J01.00).     Course:  Worsening.    Plan:  symptomatic care and follow up if not improving, amox/clav.    Orders     Orders   Pharmacy:  amoxicillin-clavulanate 875 mg-125 mg oral tablet (Prescribe): 1 tab(s), PO, q12hr, x 10 day(s), # 20 tab(s), 0 Refill(s), Type: Acute, Pharmacy: [facility] [place], 1 tab(s) po q12 hrs,x10 day(s).

## 2022-02-16 NOTE — TELEPHONE ENCOUNTER
Date: 11/16/21  Time:1:30pm   Person/Entity Released To/Fax #: [facility] @ FAX: [phone] PHONE: [phone]  Reason: DEXA SCAN  Information Released: ORDER, DEMOGRAPHICS, AND INSURANCE.  Released by: Raine Hassan

## 2022-02-16 NOTE — NURSING NOTE
Quick Intake Entered On:  5/5/2020 3:51 PM CDT    Performed On:  5/5/2020 3:41 PM CDT by Lashon Hummel RN               Summary   Chief Complaint :   Vitals   Advance Directive :   Yes   Menstrual Status :   Postmenopausal   Weight Measured :   148.8 lb(Converted to: 148 lb 13 oz, 67.49 kg)    Height Measured :   66 in(Converted to: 5 ft 6 in, 167.64 cm)    Body Mass Index :   24.01 kg/m2   Body Surface Area :   1.77 m2   Systolic Blood Pressure :   157 mmHg (HI)    Diastolic Blood Pressure :   75 mmHg   Mean Arterial Pressure :   102 mmHg   Peripheral Pulse Rate :   68 bpm   BP Site :   Right arm   Pulse Site :   Brachial artery   BP Method :   Electronic   HR Method :   Electronic   Lashon Hummel RN - 5/5/2020 3:50 PM CDT

## 2022-02-16 NOTE — NURSING NOTE
Comprehensive Intake Entered On:  6/18/2020 11:22 AM CDT    Performed On:  6/18/2020 11:17 AM CDT by Suzie Barksdale               Summary   Chief Complaint :   Pain in upper arm/hips since may. Pain consult per GTG.    Advance Directive :   Yes   Menstrual Status :   Postmenopausal   Weight Measured :   148 lb(Converted to: 148 lb 0 oz, 67.13 kg)    Height Measured :   66 in(Converted to: 5 ft 6 in, 167.64 cm)    Body Mass Index :   23.89 kg/m2   Body Surface Area :   1.77 m2   Systolic Blood Pressure :   160 mmHg (HI)    Diastolic Blood Pressure :   70 mmHg   Mean Arterial Pressure :   100 mmHg   Peripheral Pulse Rate :   70 bpm   BP Site :   Right arm   BP Method :   Manual   HR Method :   Electronic   Temperature Tympanic :   98.4 DegF(Converted to: 36.9 DegC)    Oxygen Saturation :   97 %   Suzie Barksdale - 6/18/2020 11:17 AM CDT   Health Status   Allergies Verified? :   Yes   Medication History Verified? :   Yes   Immunizations Current :   Yes   Medical History Verified? :   Yes   Pre-Visit Planning Status :   N/A   Tobacco Use? :   Never smoker   Suzie Barksdale - 6/18/2020 11:17 AM CDT   Consents   Consent for Immunization Exchange :   Consent Granted   Consent for Immunizations to Providers :   Consent Granted   Suzie Barksdale - 6/18/2020 11:17 AM CDT   Meds / Allergies   (As Of: 6/18/2020 11:22:26 AM CDT)   Allergies (Active)   adhesive tape  Estimated Onset Date:   Unspecified ; Reactions:   Rash ; Created By:   Brittany Rey; Reaction Status:   Active ; Category:   Drug ; Substance:   adhesive tape ; Type:   Allergy ; Updated By:   Brittany Rey; Reviewed Date:   6/18/2020 11:20 AM CDT      Cipro  Estimated Onset Date:   Unspecified ; Reactions:   joint pain ; Created By:   Brittany Singh MA; Reaction Status:   Active ; Category:   Drug ; Substance:   Cipro ; Type:   Allergy ; Updated By:   Brittany Singh MA; Source:   Patient ; Reviewed Date:   6/18/2020 11:20 AM CDT      doxycycline   Estimated Onset Date:   Unspecified ; Reactions:   systemic rxn ; Created By:   Brittany Singh MA; Reaction Status:   Active ; Category:   Drug ; Substance:   doxycycline ; Type:   Allergy ; Updated By:   Brittany Singh MA; Source:   Patient ; Reviewed Date:   6/18/2020 11:20 AM CDT      Dust  Estimated Onset Date:   Unspecified ; Created By:   Brittany Rey; Reaction Status:   Active ; Category:   Environment ; Substance:   Dust ; Type:   Allergy ; Updated By:   Brittany Rey; Reviewed Date:   6/18/2020 11:20 AM CDT      Mold  Estimated Onset Date:   Unspecified ; Created By:   Brittany Rey; Reaction Status:   Active ; Category:   Environment ; Substance:   Mold ; Type:   Allergy ; Updated By:   Brittany Rey; Reviewed Date:   6/18/2020 11:20 AM CDT      Pollen  Estimated Onset Date:   Unspecified ; Created By:   Brittany Rey; Reaction Status:   Active ; Category:   Environment ; Substance:   Pollen ; Type:   Allergy ; Updated By:   Brittany Rey; Reviewed Date:   6/18/2020 11:20 AM CDT      Strawberries  Estimated Onset Date:   Unspecified ; Created By:   Brittany Rey; Reaction Status:   Active ; Category:   Food ; Substance:   Strawberries ; Type:   Allergy ; Updated By:   Brittany Rey; Reviewed Date:   6/18/2020 11:20 AM CDT      sulfa drug  Estimated Onset Date:   Unspecified ; Reactions:   Rash, Difficulty breathing ; Created By:   Brittany Rey; Reaction Status:   Active ; Category:   Drug ; Substance:   sulfa drug ; Type:   Allergy ; Updated By:   Brittany Rey; Source:   Paper Chart ; Reviewed Date:   6/18/2020 11:20 AM CDT        Medication List   (As Of: 6/18/2020 11:22:26 AM CDT)   Prescription/Discharge Order    fluticasone-salmeterol  :   fluticasone-salmeterol ; Status:   Prescribed ; Ordered As Mnemonic:   Advair Diskus 100 mcg-50 mcg inhalation powder ; Simple Display Line:   1 puff(s), inh, bid, 1 EA, 11 Refill(s) ; Ordering Provider:   Josh Silverman MD; Catalog Code:    fluticasone-salmeterol ; Order Dt/Tm:   11/5/2019 3:30:00 PM CST          albuterol  :   albuterol ; Status:   Prescribed ; Ordered As Mnemonic:   ProAir HFA 90 mcg/inh inhalation aerosol ; Simple Display Line:   2 puff(s), Inhale, q4 hrs, PRN: as needed for wheezing, 1 EA, 11 Refill(s) ; Ordering Provider:   Josh Silverman MD; Catalog Code:   albuterol ; Order Dt/Tm:   11/5/2019 3:29:53 PM CST          fluticasone nasal  :   fluticasone nasal ; Status:   Prescribed ; Ordered As Mnemonic:   Flonase 50 mcg/inh nasal spray ; Simple Display Line:   100 mcg, 2 spray(s), Nasal, daily, 16 gm, 11 Refill(s) ; Ordering Provider:   Josh Silverman MD; Catalog Code:   fluticasone nasal ; Order Dt/Tm:   11/5/2019 3:29:52 PM CST          Miscellaneous Rx Supply  :   Miscellaneous Rx Supply ; Status:   Prescribed ; Ordered As Mnemonic:   compression stockings ; Simple Display Line:   See Instructions, 30-40mm Hg  459.81  chronic venous insuffiency  prevents leg pain and swelling, 2 EA ; Ordering Provider:   Josh Silverman MD; Catalog Code:   Miscellaneous Rx Supply ; Order Dt/Tm:   6/25/2012 2:41:39 PM CDT            Home Meds    calcium carbonate  :   calcium carbonate ; Status:   Documented ; Ordered As Mnemonic:   calcium (as carbonate) 600 mg oral tablet ; Simple Display Line:   1,200 mg, 2 tab(s), Oral, daily, 0 Refill(s) ; Catalog Code:   calcium carbonate ; Order Dt/Tm:   6/16/2020 1:46:54 PM CDT          multivitamin with minerals  :   multivitamin with minerals ; Status:   Documented ; Ordered As Mnemonic:   PreserVision AREDS 2 ; Simple Display Line:   1 cap(s), po, daily, 0 Refill(s) ; Catalog Code:   multivitamin with minerals ; Order Dt/Tm:   7/14/2016 9:23:00 AM CDT          omega-3 polyunsaturated fatty acids  :   omega-3 polyunsaturated fatty acids ; Status:   Documented ; Ordered As Mnemonic:   Fish Oil ; Simple Display Line:   po, 0 Refill(s) ; Catalog Code:   omega-3 polyunsaturated fatty acids ;  Order Dt/Tm:   9/15/2015 3:07:24 PM CDT          multivitamin  :   multivitamin ; Status:   Documented ; Ordered As Mnemonic:   Multiple Vitamins oral tablet ; Simple Display Line:   1 tab(s), PO, daily ; Catalog Code:   multivitamin ; Order Dt/Tm:   6/25/2012 2:16:21 PM CDT          polyethylene glycol 3350  :   polyethylene glycol 3350 ; Status:   Documented ; Ordered As Mnemonic:   MiraLax ; Simple Display Line:   17 gm, po, daily ; Catalog Code:   polyethylene glycol 3350 ; Order Dt/Tm:   10/3/2011 11:54:08 AM CDT            ID Risk Screen   Recent Travel History :   No recent travel   Family Member Travel History :   No recent travel   Other Exposure to Infectious Disease :   Unknown   Elvin/Suzie GOLDBERG - 6/18/2020 11:17 AM CDT

## 2022-02-16 NOTE — LETTER
(Inserted Image. Unable to display)     November 06, 2020      PETEY AZAR  [address], [place] [id number]          Dear PETEY,      Thank you for selecting [facility] ([facility]viously [facility] for your healthcare needs.    Our records indicate you are due for the following services:     Medicare Annual Wellness Visit.    (FYI   Regarding office visits: In some instances, a video visit or telephone visit may be offered as an option.)      To schedule an appointment or if you have further questions, please contact your primary clinic:   [facility]       [phone]   [facility]       [phone]              [facility]     [phone]      Powered by Health and [employer]    Sincerely,    Josh Silverman MD

## 2022-02-16 NOTE — TELEPHONE ENCOUNTER
---------------------  From: Venice Heard LPN (Phone Messages Pool ([id number]_[facility]))   To: [facility] Message Pool ([id number]_[place]);     Sent: 6/25/2020 8:57:49 AM CDT  Subject: General Message     Patient left message stating that she apologies that she missed Dr Cortes's call and would like to discuss treatment plan with RACHELLE.   call back number is [phone]---------------------  From: Elvin/Suzie GOLDBERG ([facility] Message Pool ([id number]_[place]))   To: Jan Felix MD;     Sent: 6/30/2020 3:31:51 PM CDT  Subject: FW: General Message

## 2022-02-16 NOTE — PROGRESS NOTES
Patient:   PETEY AZAR            MRN: [id number]            FIN: [id number]               Age:   73 years     Sex:  Female     :  1945   Associated Diagnoses:   Medicare annual wellness visit, initial; Mild intermittent asthma; Screening for diabetes mellitus; Osteopenia of the elderly; Osteopenia of the elderly; Medicare annual wellness visit, initial; Mild intermittent asthma; Screening for diabetes mellitus   Author:   Josh Silverman MD      Visit Information      Date of Service: 2019 03:00 pm  Performing Location: [facility]  Encounter#: [id number]      Primary Care Provider (PCP):  Josh Silverman MD    NPI# [id number]      Care Providers  Dr. Liang, Dentist; Dr. Cassidy, Ophthalmologist  Ancillary Services  [facility]          Current Visit Date:  2019  Last AWV Date:  10/09/2017          Chief Complaint   2019 3:04 PM CST    AWV      History of Present Illness             The patient presents for Medicare annual wellness exam.  The patient's general health status is described as unchanged and stable.  The patient's diet is described as balanced.  Exercise occasional.  Associated symptoms consist of cough, denies chest pain, denies confusion, denies decreased activity tolerance, denies headache, denies shortness of breath and denies weight loss.     Her last fasting lipid panel was done in 2014, her HDL was 129.  She is UTD on her vaccines, declines having mammograms.  Did have 1 episode of falling while walking on a trail, denies having injury, was an accidental fall.  She denies having loose stools since her cholecystectomy.  Had history of cataracts in her eyes and did have those removed.  Her eye sight has improved since.  Still uses her inhalers for asthma, which she did have a flare up recently.  She does need refills on her inhalers.  Her last DEXA scan was in 2014 and did show some osteopenia.  Does continue to walk and  watch her diet.  UTD with vision and dental exams.  She recently returned from a vacation to [place].      Review of Systems   Ear/Nose/Mouth/Throat:  Hearing is evaluated.    See completed Health History for Review of Systems   Psychiatric:  GDS noted.       Health Status   Allergies:    Allergic Reactions (Selected)  Severity Not Documented  Adhesive tape (Rash)  Cipro (Joint pain)  Doxycycline (Systemic rxn)  Dust (No reactions were documented)  Mold (No reactions were documented)  Pollen (No reactions were documented)  Strawberries (No reactions were documented)  Sulfa drug (Rash and difficulty breathing)   Medications:  (Selected)   Prescriptions  Prescribed  Advair Diskus 100 mcg-50 mcg inhalation powder: 1 puff(s), inh, bid, # 1 EA, 11 Refill(s), Type: Maintenance, Pharmacy: [facility], 1 puff(s) Inhale bid  ProAir HFA 90 mcg/inh inhalation aerosol: 2 puff(s), Inhale, q4 hrs, PRN: as needed for wheezing, # 1 EA, 11 Refill(s), Type: Soft Stop, Pharmacy: [facility], 2 puff(s) Inhale q4 hrs,PRN:as needed for wheezing  compression stockings: compression stockings, See Instructions, Instructions: 30-40mm Hg  459.81  chronic venous insuffiency  prevents leg pain and swelling, # 2 EA, 5 Refill(s), Type: Soft Stop  Documented Medications  Documented  Fish Oil: po, 0 Refill(s), Type: Maintenance  Flonase 50 mcg/inh nasal spray: = 2 spray(s), Nasal, daily, 0 Refill(s), Type: Maintenance  MiraLax: ( 17 gm ), po, daily, 0 Refill(s), Type: Maintenance  Multiple Vitamins oral tablet: 1 tab(s), PO, daily, 0 Refill(s), Type: Soft Stop  PreserVision AREDS 2: 1 cap(s), po, daily, 0 Refill(s), Type: Maintenance   Problem list:    All Problems  Osteopenia of the elderly / SNOMED CT 62306651 / Confirmed  Seasonal allergies / SNOMED CT 831227989 / Confirmed  Osteoarthritis of CMC joint of thumb / SNOMED CT 337693735 / Confirmed  Mild intermittent asthma / SNOMED CT 7019681628 / Confirmed  Asthma  / SNOMED CT 0735627579 / Confirmed  Chronic venous insufficiency / SNOMED CT 23071901 / Confirmed   Medicare Assessments      [place] Fall Risk  (11/05/2019 15:04 pm)       History of Fall in Last 3 Months [place]:  Yes     Functional Assessment  (11/05/2019 15:04 pm)       Bathing ADL Index:  Independent (2)       Dressing ADL Index:  Independent (2)       Toileting ADL Index:  Independent (2)       Transferring Bed or Chair ADL Index:  Independent (2)       Feeding ADL Index:  Independent (2)     Depression Screening  Not recorded for selected visit.    Detailed Depression Screening  Not recorded for selected visit.    Geriatric Depression Screen  (11/05/2019 15:04 pm)       Geriatric Depression Satisfied Life:  Yes       Geriatric Depression Dropped Activities:  No       Geriatric Depression Life Empty:  No       Geriatric Depression Bored:  No       Geriatric Depression Good Spirits:  Yes       Geriatric Depression Afraid Bad Things:  No       Geriatric Depression Feel Happy:  Yes       Geriatric Depression Feel Helpless:  No       Geriatric Depression Prefer to Stay Home:  No       Geriatric Depression Memory Problems:  No       Geriatric Depression Wonderful Be Alive:  Yes       Geriatric Depression Feel Worthless:  No       Geriatric Depression Situation Hopeless:  No       Geriatric Depression Others Better Off:  No       Geriatric Depression Full of Energy:  Yes       Geriatric Depression Total Score:  0     Hearing Screen  Not recorded for selected visit.     Home Safety Screen  (11/05/2019 15:04 pm)       Emergency Numbers Kept/Updated:  Yes       Aware of Smoking Dangers:  Yes       Smoke Alarms/Fire Extinguisher Available:  Yes       Household Members Fire Safety Knowledge:  Yes       Floor Rugs Removed or Fastened:  No       Mats in Bathtub/Shower:  Yes       Stairway Rails or Banisters:  Yes       Outdoor Clutter Safety:  Yes       Indoor Clutter Safety:  Yes       Electrical Cord Safety:  Yes     Vision  Screen  Not recorded for selected visit.     ADL's and Safety reviewed with patient.      Histories   Past Medical History:    Active  Chronic venous insufficiency (17495891): Onset on 2012 at 66 years.  Asthma (3592364936)  Osteopenia of the elderly (41283414)  Seasonal allergies (731171278)  Resolved  Tobacco abuse (707196339):  Resolved.  Comments:  5/3/2010 CDT 9:31 AM CDT - Mira Marion CMA  quit  Pregnancy (169839792):  Resolved in  at 26 years.  Pregnancy (808033710):  Resolved in  at 28 years.  Pregnancy (715645800):  Resolved in  at 34 years.  Chicken pox (59998794):  Resolved.   Family History:    Dementia  Mother ()  Kidney failure...  Father ()  Sarcoidosis...  Mother ()  Crohns disease...  Daughter (Milady)     Procedure history:    Extracapsular cataract extraction and insertion of intraocular lens (958470352) on 10/5/2017 at 71 Years.  Comments:  2018 2:13 PM CDT - Char Sharma  Left.  Extracapsular cataract extraction and insertion of intraocular lens (406635730) on 2017 at 71 Years.  Comments:  10/6/2017 2:55 PM CDT Char Jung  Right.  Colonoscopy (904069528) on 10/21/2016 at 70 Years.  Comments:  10/24/2016 2:22 PM CDT - Brittany Singh MA  Indication:   screening  Sedation:   Midazolam 3mg, Fentanyl 150mcg--IV  Findings:  no polyps/abnormalities seen  Recommendation:   f/u only as needed.  Cholecystectomy with cholangiography (555834399) on 2013 at 67 Years.  Colonoscopy (906488785) on 2006 at 60 Years.  Comments:  2013 3:31 PM CST - Talia Llanes CMA  Procedure done in [place].  Repeat in 3 years with yearly hemoccults.    2012 9:28 AM CDT - Brittany Singh MA  diverticulosis.    rpt 2009  Hernia repair (26302445) on 1994 at 48 Years.  Comments:  2013 12:42 PM DONTET - Brittany Rey  Right femoral hernia  Tonsillectomy (950951535).  Breast biopsy and related procedures (466975515).  Comments:  5/3/2010 9:28  ROBYN CDT - Mira Wu  benign  cyst removal.  Knee replacement (857656502).  venous ligation.  Comments:  6/25/2012 4:52 PM CDT - Francisco GOLDBERG, Brittany  both legs.   Social History:        Alcohol Assessment            Current, Wine (5 oz), Daily, 1 drinks/episode average.  2 drinks/episode maximum.      Tobacco Assessment            Past      Substance Abuse Assessment            Never      Employment and Education Assessment            Retired      Home and Environment Assessment            Marital status: .  Spouse/Partner name: Tomy.  Lives with Spouse.  3 children.      Nutrition and Health Assessment            Type of diet: Regular.      Exercise and Physical Activity Assessment            Exercise frequency: 3-4 times/week.  Exercise type: Aerobics, Walking, Yoga, hiking.      Sexual Assessment            Sexually active: No.        Physical Examination   Vital Signs   11/5/2019 3:04 PM CST Temperature Tympanic 98.4 DegF    Peripheral Pulse Rate 68 bpm    Pulse Site Radial artery    HR Method Manual    Systolic Blood Pressure 128 mmHg    Diastolic Blood Pressure 70 mmHg    Mean Arterial Pressure 89 mmHg    BP Site Left arm    BP Method Manual      Measurements from flowsheet : Measurements   11/5/2019 3:04 PM CST Height Measured - Standard 66 in    Weight Measured - Standard 148 lb    BSA 1.77 m2    Body Mass Index 23.89 kg/m2      General:  Alert and oriented, No acute distress.    Eye:  Pupils are equal, round and reactive to light, Normal conjunctiva.    HENT:  Tympanic membranes are clear, Oral mucosa is moist.    Neck:  Supple, Non-tender, No carotid bruit, No lymphadenopathy.    Respiratory:  Respirations are non-labored.    Cardiovascular:  Normal rate, Regular rhythm, No edema.    Gastrointestinal:  Soft, Non-tender, Non-distended.    Musculoskeletal:  Normal range of motion, Normal gait.    Integumentary:  Warm, No rash.    Neurologic:  Alert, Oriented, No focal deficits.    Cognition  and Speech:  Oriented, Speech clear and coherent, Functional cognition intact.    Psychiatric:  Cooperative, Appropriate mood & affect, Normal judgment, Patient's GDS and CAGE questionnaire reviewed and discussed with patient. .       Impression and Plan   Course:  Progressing as expected.    Plan:  Discussed  preventative services.  Appropriate weight and diet discussed.  Discussed Advance Life Directives.    Preventative services needed::  Preventative services checklist reviewed, updated and copy given to patient.  Please see scanned document.         HIV risk screening: No.    Patient Instructions:          Limitations: Limit caffeine intake, Limit alcohol consumption.         Counseled: Patient, Regarding treatment, Regarding medications, Diet, Activity, Verbalized understanding.    Diagnosis     Medicare annual wellness visit, initial (HXK64-QY Z00.00).     Mild intermittent asthma (RJT81-NC J45.20).     Screening for diabetes mellitus (VBI97-HW Z13.1).     Osteopenia of the elderly (JFP33-SH M85.80).     Orders     Orders (Selected)   Outpatient Orders  Ordered  DEXA Scan (Request): Osteopenia of the elderly  Ordered (Dispatched)  Glucose* ([facility]): Specimen Type: Serum, Collection Date: 11/05/19 15:32:00 CST  Prescriptions  Prescribed  Advair Diskus 100 mcg-50 mcg inhalation powder: 1 puff(s), inh, bid, # 1 EA, 11 Refill(s), Type: Maintenance, Pharmacy: [facility], 1 puff(s) Inhale bid  Flonase 50 mcg/inh nasal spray: = 2 spray(s) ( 100 mcg ), Nasal, daily, # 16 gm, 11 Refill(s), Type: Maintenance, Pharmacy: [facility] [place], 2 spray(s) Nasal daily  ProAir HFA 90 mcg/inh inhalation aerosol: 2 puff(s), Inhale, q4 hrs, PRN: as needed for wheezing, # 1 EA, 11 Refill(s), Type: Soft Stop, Pharmacy: [facility] [place], 2 puff(s) Inhale q4 hrs,PRN:as needed for wheezing  Documented Medications  Discontinued  Flonase 50 mcg/inh nasal spray: = 2 spray(s), Nasal, daily, 0  Refill(s), Type: Maintenance.     Plan:  Schedule DEXA scan, screening glucose today, medication refills sent in at current doses.  RTC 1 year for AWV..      I, Brittany Singh MA, acted solely as a scribe for, and in the presence of Dr. Josh Silverman who performed the services.    I, Josh Silverman MD, personally performed the services described in this documentation.  The documentation was scribed in my presence and is both accurate and complete.      Total time spent with patient and coordination of care:  _  minutes

## 2022-02-16 NOTE — PROGRESS NOTES
Patient:   PETEY AZAR            MRN: [id number]            FIN: [id number]               Age:   72 years     Sex:  Female     :  1945   Associated Diagnoses:   Medicare annual wellness visit, subsequent; Mild intermittent asthma   Author:   Josh Silverman MD      Visit Information      Date of Service: 10/11/2018 11:51 am  Performing Location: [facility]  Encounter#: [id number]      Primary Care Provider (PCP):  Josh Silverman MD    NPI# [id number]   Visit type:  Medicare, annual wellness visit.    Accompanied by:  No one.    Source of history:  Self.    History limitation:  None.       Chief Complaint   10/11/2018 11:59 AM CDT  AWV      Well Adult History   Well Adult History             The patient presents for Medicare well exam.  The patient's general health status is described as good.  The patient's diet is described as balanced.  Exercise: routine.  Associated symptoms consist of none.  Last menstrual period: menopausal.  Medical encounters:.  Complaint:.  Additional pertinent history: daily caffeine use, tobacco use none and occasional alcohol use.       mammogram:  due  Pap smear:  n/a  colonoscopy: up to date   lipid and diabetes screening:  up to date   immunizations:  due for influenza  other:  asthma under control      Review of Systems   Constitutional:  No fever, No chills, No sweats, No weakness, No fatigue.    Eye:  No recent visual problem.    Ear/Nose/Mouth/Throat:  No decreased hearing, No nasal congestion, No sore throat.    Respiratory:  No shortness of breath, No cough.    Cardiovascular:  Negative, No chest pain, No palpitations, No peripheral edema.    Gastrointestinal:  No nausea, No vomiting, No diarrhea, No constipation, No heartburn.    Genitourinary:  No dysuria, No change in urine stream.    Hematology/Lymphatics:  No bruising tendency, No bleeding tendency.    Endocrine:  No cold intolerance, No heat intolerance.    Immunologic:  Negative.     Musculoskeletal:  No back pain, No neck pain, No joint pain, No muscle pain.    Integumentary:  Rash, No dryness, No skin lesion.    Neurologic:  Alert and oriented X4,   Cognitive impairment:  none   Year: [place]  [place]  [place]    Psychiatric:  No anxiety, No depression.    GDS and CAGE reviewed and discussed with patient.      Hearing impairment:  no  ADL: independent  Fall risk:  no  Home safety:  OK    Advanced care planning:  completed      Health Status   Allergies:    Allergic Reactions (Selected)  Severity Not Documented  Adhesive tape (Rash)  Cipro (Joint pain)  Doxycycline (Systemic rxn)  Dust (No reactions were documented)  Mold (No reactions were documented)  Pollen (No reactions were documented)  Strawberries (No reactions were documented)  Sulfa drug (Rash and difficulty breathing)   Medications:  (Selected)   Prescriptions  Prescribed  Advair Diskus 100 mcg-50 mcg inhalation powder: 1 puff(s), inh, bid, # 60 EA, 8 Refill(s), Type: Maintenance, Pharmacy: [facility]  ProAir HFA 90 mcg/inh inhalation aerosol: See Instructions, Instructions: INHALE 2 PUFFS BY MOUTH EVERY 4 HOURS, # 8.5 unknown unit, Type: Soft Stop, Pharmacy: [facility], INHALE 2 PUFFS BY MOUTH EVERY 4 HOURS  compression stockings: compression stockings, See Instructions, Instructions: 30-40mm Hg  459.81  chronic venous insuffiency  prevents leg pain and swelling, # 2 EA, 5 Refill(s), Type: Soft Stop  fluticasone 50 mcg/inh nasal spray: 2 spray(s), nasal, daily, # 1 EA, 11 Refill(s), Pharmacy: [facility], 2 spray(s) nasal daily  Documented Medications  Documented  Fish Oil: po, 0 Refill(s), Type: Maintenance  MiraLax: ( 17 gm ), po, daily, 0 Refill(s), Type: Maintenance  Multiple Vitamins oral tablet: 1 tab(s), PO, daily, 0 Refill(s), Type: Soft Stop  PreserVision AREDS 2: 1 cap(s), po, daily, 0 Refill(s), Type: Maintenance   Problem list:    All Problems  Asthma / SNOMED CT  7518096042 / Confirmed  Mild intermittent asthma / SNOMED CT 9075150054 / Confirmed  Osteoarthritis of CMC joint of thumb / SNOMED CT 907383834 / Confirmed  Chronic venous insufficiency / SNOMED CT 19778185 / Confirmed  Seasonal allergies / SNOMED CT 969503507 / Confirmed  Osteopenia of the elderly / SNOMED CT 38120567 / Confirmed  Resolved: Tobacco abuse / SNOMED CT 927998435  Resolved: Pregnancy / SNOMED CT 230464184  Resolved: Pregnancy / SNOMED CT 457555193  Resolved: Pregnancy / SNOMED CT 067802155  Resolved: Chicken pox / SNOMED CT 58417540     Other Healthcare:         Histories   Past Medical History:    Active  Chronic venous insufficiency (82215774): Onset on 2012 at 66 years.  Asthma (1427513872)  Osteopenia of the elderly (14205857)  Seasonal allergies (733647084)  Resolved  Tobacco abuse (541074636):  Resolved.  Comments:  5/3/2010 CDT 9:31 AM CDT - Mira Marion CMA  quit  Pregnancy (891331714):  Resolved in  at 26 years.  Pregnancy (589192613):  Resolved in  at 28 years.  Pregnancy (379138634):  Resolved in  at 34 years.  Chicken pox (38909720):  Resolved.   Family History:    Dementia  Mother ()  Kidney failure...  Father ()  Sarcoidosis...  Mother ()  Crohns disease...  Daughter (Milady)     Procedure history:    Extracapsular cataract extraction and insertion of intraocular lens (SNOMED CT 528650357) on 10/5/2017 at 71 Years.  Comments:  2018 2:13 PM - Char Sharma  Left.  Extracapsular cataract extraction and insertion of intraocular lens (SNOMED CT 710439538) on 2017 at 71 Years.  Comments:  10/6/2017 2:55 PM - Char Sharma  Right.  Colonoscopy (SNOMED CT 526090284) on 10/21/2016 at 70 Years.  Comments:  10/24/2016 2:22 PM - Francisco GOLDBERG, Brittany  Indication:   screening  Sedation:   Midazolam 3mg, Fentanyl 150mcg--IV  Findings:  no polyps/abnormalities seen  Recommendation:   f/u only as needed.  Cholecystectomy with cholangiography  (SNOMED CT 016264276) on 6/11/2013 at 67 Years.  Colonoscopy (SNOMED CT 229439108) on 2/8/2006 at 60 Years.  Comments:  12/19/2013 3:31 PM - Talia Llanes CMA  Procedure done in [place].  Repeat in 3 years with yearly hemoccults.    6/25/2012 9:28 AM - Brittany Singh MA  diverticulosis.    rpt 02/2009  Hernia repair (SNOMED CT 45110712) on 7/1/1994 at 48 Years.  Comments:  6/7/2013 12:42 PM - Brittany Rey  Right femoral hernia  Tonsillectomy (SNOMED CT 283074151).  Breast biopsy and related procedures (SNOMED CT 739203636).  Comments:  5/3/2010 9:28 AM - Mira Wu  benign  cyst removal.  Knee replacement (SNOMED CT 538094399).  venous ligation.  Comments:  6/25/2012 4:52 PM - Brittany Singh MA  both legs.   Social History:        Alcohol Assessment            Current, Wine (5 oz), Daily, 1 drinks/episode average.  2 drinks/episode maximum.      Tobacco Assessment            Past      Substance Abuse Assessment            Never      Employment and Education Assessment            Retired      Home and Environment Assessment            Marital status: .  Spouse/Partner name: Tomy.  Lives with Spouse.  3 children.      Nutrition and Health Assessment            Type of diet: Regular.      Exercise and Physical Activity Assessment            Exercise frequency: 3-4 times/week.  Exercise type: Aerobics, Walking, Yoga, hiking.      Sexual Assessment            Sexually active: No.        Physical Examination   Vital Signs   10/11/2018 11:59 AM CDT Temperature Tympanic 99.1 DegF    Peripheral Pulse Rate 72 bpm    Pulse Site Radial artery    HR Method Manual    Systolic Blood Pressure 136 mmHg  HI    Diastolic Blood Pressure 86 mmHg  HI    Mean Arterial Pressure 103 mmHg    BP Site Right arm    BP Method Manual      Measurements from flowsheet : Measurements   10/11/2018 11:59 AM CDT Height Measured - Standard 65.5 in    Weight Measured - Standard 146.6 lb    BSA 1.75 m2    Body Mass Index 24.02 kg/m2       General:  Alert and oriented, No acute distress.    Eye:  Pupils are equal, round and reactive to light, Extraocular movements are intact, Normal conjunctiva.    HENT:  Normocephalic, Tympanic membranes are clear, Oral mucosa is moist, No pharyngeal erythema, No sinus tenderness.    Neck:  Supple, Non-tender, No carotid bruit, No lymphadenopathy, No thyromegaly.    Respiratory:  Lungs are clear to auscultation, Respirations are non-labored, Breath sounds are equal, No chest wall tenderness.    Cardiovascular:  Normal rate, Regular rhythm, No murmur, No gallop, Normal peripheral perfusion, No edema.    Gastrointestinal:  Soft, Non-tender, No organomegaly.    Musculoskeletal:  Normal range of motion, Normal strength, No swelling, No deformity.    Integumentary:  Warm, Dry, No rash.    Neurologic:  Alert, Oriented, No focal deficits.    Psychiatric:  Cooperative, Appropriate mood & affect.       Review / Management   Results review      Impression and Plan   Diagnosis     Medicare annual wellness visit, subsequent ([id number] Z09).     Course:  Progressing as expected.    Plan:  Counseled on health maintenance, diet, activity, BMI discussed.    Patient Instructions:       Counseled: Regarding diagnosis, Regarding medications, Smoking cessation, Verbalized understanding, Breast cancer, colon cancer, diabetes, lipid, HTN, obesity screening discussed and initiated, Risk factors for development of chronic disease and infirmities of ageing have been discussed and plans for minimizing and screening for these conditions have been discussed.  Plans in place for management of conditions identified.  The preventative screening checklist has been completed and reviewed with the patient and a copy has been filled in the electronic record..    Diagnosis     Mild intermittent asthma (TMR90-AL J45.20).     Course:  Well controlled.    Plan:  continue current medication.    Orders     Orders (Selected)    Prescriptions  Prescribed  Advair Diskus 100 mcg-50 mcg inhalation powder: 1 puff(s), inh, bid, # 1 EA, 11 Refill(s), Type: Maintenance, Pharmacy: [facility], 1 puff(s) Inhale bid  ProAir HFA 90 mcg/inh inhalation aerosol: 2 puff(s), Inhale, q4 hrs, PRN: as needed for wheezing, # 1 EA, 11 Refill(s), Type: Soft Stop, Pharmacy: [facility], 2 puff(s) Inhale q4 hrs,PRN:as needed for wheezing  fluticasone 50 mcg/inh nasal spray: = 2 spray(s), nasal, daily, x 30 day(s), # 1 EA, 11 Refill(s), Type: Physician Stop, Pharmacy: [facility], 2 spray(s) Nasal daily,x30 day(s).

## 2022-02-16 NOTE — TELEPHONE ENCOUNTER
---------------------  From: Josh Silverman MD   To: PETEY AZAR    Sent: 11/6/2019 11:02:02 AM CST  Subject: Patient Message - Results Notification      Your glucose level is normal.    Results:  Date Result Name Value Ref Range   11/5/2019 3:40 PM Glucose Level 96 mg/dL (65 - 139)

## 2022-02-16 NOTE — TELEPHONE ENCOUNTER
---------------------  From: Andra Goldberg LPN (Phone Messages Pool ([id number]))   To: [employer] Message Pool ([address] [place] [address]);     Sent: 3/25/2020 4:05:02 PM CDT  Subject: CONSUMER MESSAGE FW: Asthma Control Test           ---------------------  From: PETEY AZAR  To: [facility]  Sent: 03/25/2020 04:02 p.m. CDT  Subject: Asthma Control Test  Attn: Brittany Singh  Asthma control test final score was 18.  In order of questions, answers were :  5  3  5  2  3---------------------  From: Brittany Singh MA ([employer] Message Pool ([id number]))   To: PETEY AZAR    Sent: 3/25/2020 4:09:43 PM CDT  Subject: FW: CONSUMER MESSAGE FW: Asthma Control Test     Thank you Petey for sending the results.    Brittany BANKS CMA/Dr. RECIO

## 2022-02-16 NOTE — NURSING NOTE
Medicare Visit Entered On:  11/12/2020 11:07 AM CST    Performed On:  11/12/2020 11:03 AM CST by Venice Heard LPN               Summary   Advance Directive :   Yes   Menstrual Status :   Postmenopausal   Height Measured :   66 in(Converted to: 5 ft 6 in, 167.64 cm)    Venice Heard LPN - 11/12/2020 11:03 AM CST   Health Status   Allergies Verified? :   Yes   Medication History Verified? :   Yes   Immunizations Current :   Yes   Pre-Visit Planning Status :   Completed   Tobacco Use? :   Never smoker   Venice Heard LPN - 11/12/2020 11:03 AM CST   Consents   Consent for Immunization Exchange :   Consent Granted   Consent for Immunizations to Providers :   Consent Granted   Venice Heard LPN - 11/12/2020 11:03 AM CST   Meds / Allergies   (As Of: 11/12/2020 11:07:42 AM CST)   Allergies (Active)   adhesive tape  Estimated Onset Date:   Unspecified ; Reactions:   Rash ; Created By:   Brittany Rey; Reaction Status:   Active ; Category:   Drug ; Substance:   adhesive tape ; Type:   Allergy ; Updated By:   Brittany Rey; Reviewed Date:   6/18/2020 11:20 AM CDT      Cipro  Estimated Onset Date:   Unspecified ; Reactions:   joint pain ; Created By:   Brittany Singh MA; Reaction Status:   Active ; Category:   Drug ; Substance:   Cipro ; Type:   Allergy ; Updated By:   Brittany Singh MA; Source:   Patient ; Reviewed Date:   6/18/2020 11:20 AM CDT      doxycycline  Estimated Onset Date:   Unspecified ; Reactions:   systemic rxn ; Created By:   Brittany Singh MA; Reaction Status:   Active ; Category:   Drug ; Substance:   doxycycline ; Type:   Allergy ; Updated By:   Brittany Singh MA; Source:   Patient ; Reviewed Date:   6/18/2020 11:20 AM CDT      Dust  Estimated Onset Date:   Unspecified ; Created By:   Brittany Rey; Reaction Status:   Active ; Category:   Environment ; Substance:   Dust ; Type:   Allergy ; Updated By:   Brittany Rey; Reviewed Date:   6/18/2020 11:20 AM CDT      Mold  Estimated  Onset Date:   Unspecified ; Created By:   Brittany Rey; Reaction Status:   Active ; Category:   Environment ; Substance:   Mold ; Type:   Allergy ; Updated By:   Brittany Rey; Reviewed Date:   6/18/2020 11:20 AM CDT      Pollen  Estimated Onset Date:   Unspecified ; Created By:   Brittany Rey; Reaction Status:   Active ; Category:   Environment ; Substance:   Pollen ; Type:   Allergy ; Updated By:   Brittany Rey; Reviewed Date:   6/18/2020 11:20 AM CDT      Strawberries  Estimated Onset Date:   Unspecified ; Created By:   Brittany Rey; Reaction Status:   Active ; Category:   Food ; Substance:   Strawberries ; Type:   Allergy ; Updated By:   Brittany Rey; Reviewed Date:   6/18/2020 11:20 AM CDT      sulfa drug  Estimated Onset Date:   Unspecified ; Reactions:   Rash, Difficulty breathing ; Created By:   Brittany Rey; Reaction Status:   Active ; Category:   Drug ; Substance:   sulfa drug ; Type:   Allergy ; Updated By:   Brittany Rey; Source:   Paper Chart ; Reviewed Date:   6/18/2020 11:20 AM CDT        Medication List   (As Of: 11/12/2020 11:07:42 AM CST)   Prescription/Discharge Order    albuterol  :   albuterol ; Status:   Prescribed ; Ordered As Mnemonic:   ProAir HFA 90 mcg/inh inhalation aerosol ; Simple Display Line:   2 puff(s), Inhale, q4 hrs, PRN: as needed for wheezing, 1 EA, 11 Refill(s) ; Ordering Provider:   Josh Silverman MD; Catalog Code:   albuterol ; Order Dt/Tm:   11/5/2019 3:29:53 PM CST          alendronate  :   alendronate ; Status:   Prescribed ; Ordered As Mnemonic:   alendronate 70 mg oral tablet ; Simple Display Line:   70 mg, 1 tab(s), Oral, qweek, with 6-8 oz plain water, at least 30 minutes before first food, beverage, or medication of the day, 4 tab(s), 5 Refill(s) ; Ordering Provider:   Jan Felix MD; Catalog Code:   alendronate ; Order Dt/Tm:   6/18/2020 11:52:04 AM CDT          fluticasone-salmeterol  :   fluticasone-salmeterol ; Status:   Prescribed ; Ordered  As Mnemonic:   Advair Diskus 100 mcg-50 mcg inhalation powder ; Simple Display Line:   See Instructions, INHALE 1  PUFF TWICE A DAY, 1 EA, 0 Refill(s) ; Ordering Provider:   Josh Silverman MD; Catalog Code:   fluticasone-salmeterol ; Order Dt/Tm:   10/28/2020 2:10:28 PM CDT          fluticasone nasal  :   fluticasone nasal ; Status:   Prescribed ; Ordered As Mnemonic:   fluticasone 50 mcg/inh nasal spray ; Simple Display Line:   2 spray(s), Nasal, daily, **DUE FOR APPT FOR FURTHER REFILLS**, 1 EA, 0 Refill(s) ; Ordering Provider:   Josh Silverman MD; Catalog Code:   fluticasone nasal ; Order Dt/Tm:   10/28/2020 2:09:19 PM CDT          Miscellaneous Rx Supply  :   Miscellaneous Rx Supply ; Status:   Prescribed ; Ordered As Mnemonic:   compression stockings ; Simple Display Line:   See Instructions, 30-40mm Hg  459.81  chronic venous insuffiency  prevents leg pain and swelling, 2 EA ; Ordering Provider:   Josh Silverman MD; Catalog Code:   Miscellaneous Rx Supply ; Order Dt/Tm:   6/25/2012 2:41:39 PM CDT          predniSONE  :   predniSONE ; Status:   Prescribed ; Ordered As Mnemonic:   predniSONE 5 mg oral tablet ; Simple Display Line:   10 mg, 2 tab(s), Oral, daily, 60 tab(s), 3 Refill(s) ; Ordering Provider:   Jan Felix MD; Catalog Code:   predniSONE ; Order Dt/Tm:   8/11/2020 5:05:58 PM CDT            Home Meds    calcium carbonate  :   calcium carbonate ; Status:   Documented ; Ordered As Mnemonic:   calcium (as carbonate) 600 mg oral tablet ; Simple Display Line:   1,200 mg, 2 tab(s), Oral, daily, 0 Refill(s) ; Catalog Code:   calcium carbonate ; Order Dt/Tm:   6/16/2020 1:46:54 PM CDT          multivitamin  :   multivitamin ; Status:   Documented ; Ordered As Mnemonic:   Multiple Vitamins oral tablet ; Simple Display Line:   1 tab(s), PO, daily ; Catalog Code:   multivitamin ; Order Dt/Tm:   6/25/2012 2:16:21 PM CDT          multivitamin with minerals  :   multivitamin with minerals ; Status:    Documented ; Ordered As Mnemonic:   PreserVision AREDS 2 ; Simple Display Line:   1 cap(s), po, daily, 0 Refill(s) ; Catalog Code:   multivitamin with minerals ; Order Dt/Tm:   7/14/2016 9:23:00 AM CDT          omega-3 polyunsaturated fatty acids  :   omega-3 polyunsaturated fatty acids ; Status:   Documented ; Ordered As Mnemonic:   Fish Oil ; Simple Display Line:   po, 0 Refill(s) ; Catalog Code:   omega-3 polyunsaturated fatty acids ; Order Dt/Tm:   9/15/2015 3:07:24 PM CDT          polyethylene glycol 3350  :   polyethylene glycol 3350 ; Status:   Documented ; Ordered As Mnemonic:   MiraLax ; Simple Display Line:   17 gm, po, daily ; Catalog Code:   polyethylene glycol 3350 ; Order Dt/Tm:   10/3/2011 11:54:08 AM CDT            Depression Screening   Little Interest - Pleasure in Activities :   Not at all   Feeling Down, Depressed, Hopeless :   Not at all   Initial Depression Screen Score :   0 Score   Venice Heard LPN - 11/12/2020 11:03 AM CST   Geriatric Depression Screening   Geriatric Depression Satisfied Life :   Yes   Geriatric Depression Dropped Activities :   No   Geriatric Depression Life Empty :   No   Geriatric Depression Bored :   No   Geriatric Depression Good Spirits :   Yes   Geriatric Depression Afraid Bad Things :   No   Geriatric Depression Feel Happy :   Yes   Geriatric Depression Feel Helpless :   No   Geriatric Depression Prefer to Stay Home :   No   Geriatric Depression Memory Problems :   No   Geriatric Depression Wonderful Be Alive :   Yes   Geriatric Depression Feel Worthless :   No   Geriatric Depression Situation Hopeless :   No   Geriatric Depression Others Better Off :   No   Geriatric Depression Full of Energy :   Yes   Geriatric Depression Total Score :   0    Venice Heard LPN - 11/12/2020 11:03 AM CST   Hearing and Vision Screening   Audiogram Result Right Ear :   Pass   Audiogram Result Left Ear :   Pass   Venice Heard LPN - 11/12/2020 11:03 AM CST   Home Safety  Screen   Emergency Numbers Kept/Updated :   Yes   Aware of Smoking Dangers :   Yes   Smoke Alarms/Fire Extinguisher Available :   Yes   Household Members Fire Safety Knowledge :   Yes   Floor Rugs Removed or Fastened :   No   Mats in Bathtub/Shower :   Yes   Outdoor Clutter Safety :   Yes   Indoor Clutter Safety :   Yes   Electrical Cord Safety :   Yes   Venice Heard LPN - 11/12/2020 11:03 AM CST   Advance Directives   Advance Directive :   Yes   Venice Heard LPN - 11/12/2020 11:03 AM CST   [place] Fall Risk   History of Fall in Last 3 Months [place] :   Yes   Venice Heard LPN - 11/12/2020 11:03 AM CST   Functional Assessment   Focused Functional Assessment Grid   Bathing :   Independent (2)   Dressing :   Independent (2)   Toileting :   Independent (2)   Transferring Bed or Chair :   Independent (2)   Continence :   Independent (2)   Feeding :   Independent (2)   Venice Heard LPN - 11/12/2020 11:03 AM CST   ADL Index Score :   12    Capable of Shopping :   Yes   Capable of Walking :   Yes   Capable of Housekeeping :   Yes   Capable of Managing Medications :   Yes   Capable of Handling Finances :   Yes   Venice Heard LPN - 11/12/2020 11:03 AM CST

## 2022-02-16 NOTE — NURSING NOTE
Medicare Visit Entered On:  11/15/2021 2:41 PM CST    Performed On:  11/15/2021 2:29 PM CST by Venice Heard LPN               Summary   Chief Complaint :   AWV-medicare   Advance Directive :   Yes   Menstrual Status :   Postmenopausal   Weight Measured :   148 lb(Converted to: 148 lb 0 oz, 67.132 kg)    Height Measured :   66 in(Converted to: 5 ft 6 in, 167.64 cm)    Body Mass Index :   23.89 kg/m2   Body Surface Area :   1.77 m2   Systolic Blood Pressure :   118 mmHg   Diastolic Blood Pressure :   74 mmHg   Mean Arterial Pressure :   89 mmHg   Peripheral Pulse Rate :   76 bpm   BP Site :   Right arm   Pulse Site :   Radial artery   BP Method :   Manual   HR Method :   Manual   Temperature Tympanic :   97.6 DegF(Converted to: 36.4 DegC)  (LOW)    Venice Heard LPN - 11/15/2021 2:29 PM CST   Health Status   Allergies Verified? :   Yes   Medication History Verified? :   Yes   Immunizations Current :   Yes   Pre-Visit Planning Status :   Completed   Venice Heard LPN - 11/15/2021 2:29 PM CST   Consents   Consent for Immunization Exchange :   Consent Granted   Consent for Immunizations to Providers :   Consent Granted   Venice Heard LPN - 11/15/2021 2:29 PM CST   Meds / Allergies   (As Of: 11/15/2021 2:41:56 PM CST)   Allergies (Active)   adhesive tape  Estimated Onset Date:   Unspecified ; Reactions:   Rash ; Created By:   Brittany Rey; Reaction Status:   Active ; Category:   Drug ; Substance:   adhesive tape ; Type:   Allergy ; Updated By:   Brittany Rey; Reviewed Date:   11/15/2021 2:39 PM CST      Cipro  Estimated Onset Date:   Unspecified ; Reactions:   joint pain ; Created By:   Brittany Singh MA; Reaction Status:   Active ; Category:   Drug ; Substance:   Cipro ; Type:   Allergy ; Updated By:   Brittany Singh MA; Source:   Patient ; Reviewed Date:   11/15/2021 2:39 PM CST      doxycycline  Estimated Onset Date:   Unspecified ; Reactions:   systemic rxn ; Created By:   Francisco GOLDBERG  Brittany; Reaction Status:   Active ; Category:   Drug ; Substance:   doxycycline ; Type:   Allergy ; Updated By:   Brittany Singh MA; Source:   Patient ; Reviewed Date:   11/15/2021 2:39 PM CST      Dust  Estimated Onset Date:   Unspecified ; Created By:   Brittany Rey; Reaction Status:   Active ; Category:   Environment ; Substance:   Dust ; Type:   Allergy ; Updated By:   Brittany Rey; Reviewed Date:   11/15/2021 2:39 PM CST      Mold  Estimated Onset Date:   Unspecified ; Created By:   Brittany Rey; Reaction Status:   Active ; Category:   Environment ; Substance:   Mold ; Type:   Allergy ; Updated By:   Brittany Rey; Reviewed Date:   11/15/2021 2:39 PM CST      Pollen  Estimated Onset Date:   Unspecified ; Created By:   Brittany Rey; Reaction Status:   Active ; Category:   Environment ; Substance:   Pollen ; Type:   Allergy ; Updated By:   Brittany Rey; Reviewed Date:   11/15/2021 2:39 PM CST      Strawberries  Estimated Onset Date:   Unspecified ; Created By:   Brittany Rey; Reaction Status:   Active ; Category:   Food ; Substance:   Strawberries ; Type:   Allergy ; Updated By:   Brittany Rey; Reviewed Date:   11/15/2021 2:39 PM CST      sulfa drug  Estimated Onset Date:   Unspecified ; Reactions:   Rash, Difficulty breathing ; Created By:   Brittany Rey; Reaction Status:   Active ; Category:   Drug ; Substance:   sulfa drug ; Type:   Allergy ; Updated By:   Brittany Rey; Source:   Paper Chart ; Reviewed Date:   11/15/2021 2:39 PM CST        Medication List   (As Of: 11/15/2021 2:41:56 PM CST)   Prescription/Discharge Order    albuterol  :   albuterol ; Status:   Prescribed ; Ordered As Mnemonic:   ProAir HFA 90 mcg/inh inhalation aerosol ; Simple Display Line:   2 puff(s), Inhale, q4 hrs, PRN: as needed for wheezing, 1 EA, 3 Refill(s) ; Ordering Provider:   Josh Silverman MD; Catalog Code:   albuterol ; Order Dt/Tm:   11/12/2020 11:34:11 AM CST          alendronate  :   alendronate ;  Status:   Prescribed ; Ordered As Mnemonic:   alendronate 70 mg oral tablet ; Simple Display Line:   70 mg, 1 tab(s), Oral, qweek, with 6-8 oz plain water, at least 30 minutes before first food, beverage, or medication of the day, 13 tab(s), 3 Refill(s) ; Ordering Provider:   Josh Silverman MD; Catalog Code:   alendronate ; Order Dt/Tm:   11/12/2020 11:34:12 AM CST          fluticasone-salmeterol  :   fluticasone-salmeterol ; Status:   Prescribed ; Ordered As Mnemonic:   Advair Diskus 100 mcg-50 mcg inhalation powder ; Simple Display Line:   See Instructions, INHALE 1  PUFF TWICE A DAY, 3 EA, 3 Refill(s) ; Ordering Provider:   Josh Silverman MD; Catalog Code:   fluticasone-salmeterol ; Order Dt/Tm:   11/12/2020 11:40:38 AM CST          fluticasone nasal  :   fluticasone nasal ; Status:   Prescribed ; Ordered As Mnemonic:   fluticasone 50 mcg/inh nasal spray ; Simple Display Line:   2 spray(s), Nasal, daily, 3 EA, 3 Refill(s) ; Ordering Provider:   Josh Silverman MD; Catalog Code:   fluticasone nasal ; Order Dt/Tm:   11/12/2020 11:34:13 AM CST          Miscellaneous Rx Supply  :   Miscellaneous Rx Supply ; Status:   Prescribed ; Ordered As Mnemonic:   compression stockings ; Simple Display Line:   See Instructions, 30-40mm Hg  459.81  chronic venous insuffiency  prevents leg pain and swelling, 2 EA ; Ordering Provider:   Josh Silverman MD; Catalog Code:   Miscellaneous Rx Supply ; Order Dt/Tm:   6/25/2012 2:41:39 PM CDT          predniSONE  :   predniSONE ; Status:   Completed ; Ordered As Mnemonic:   predniSONE 5 mg oral tablet ; Simple Display Line:   10 mg, 2 tab(s), Oral, daily, 60 tab(s), 3 Refill(s) ; Ordering Provider:   Jan Felix MD; Catalog Code:   predniSONE ; Order Dt/Tm:   8/11/2020 5:05:58 PM CDT            Home Meds    calcium carbonate  :   calcium carbonate ; Status:   Documented ; Ordered As Mnemonic:   calcium (as carbonate) 600 mg oral tablet ; Simple Display Line:   1,200 mg,  2 tab(s), Oral, daily, 0 Refill(s) ; Catalog Code:   calcium carbonate ; Order Dt/Tm:   6/16/2020 1:46:54 PM CDT          multivitamin  :   multivitamin ; Status:   Documented ; Ordered As Mnemonic:   Multiple Vitamins oral tablet ; Simple Display Line:   1 tab(s), PO, daily ; Catalog Code:   multivitamin ; Order Dt/Tm:   6/25/2012 2:16:21 PM CDT          multivitamin with minerals  :   multivitamin with minerals ; Status:   Documented ; Ordered As Mnemonic:   PreserVision AREDS 2 ; Simple Display Line:   1 cap(s), po, daily, 0 Refill(s) ; Catalog Code:   multivitamin with minerals ; Order Dt/Tm:   7/14/2016 9:23:00 AM CDT          omega-3 polyunsaturated fatty acids  :   omega-3 polyunsaturated fatty acids ; Status:   Documented ; Ordered As Mnemonic:   Fish Oil ; Simple Display Line:   po, 0 Refill(s) ; Catalog Code:   omega-3 polyunsaturated fatty acids ; Order Dt/Tm:   9/15/2015 3:07:24 PM CDT          polyethylene glycol 3350  :   polyethylene glycol 3350 ; Status:   Documented ; Ordered As Mnemonic:   MiraLax ; Simple Display Line:   17 gm, po, daily ; Catalog Code:   polyethylene glycol 3350 ; Order Dt/Tm:   10/3/2011 11:54:08 AM CDT            Social History   Social History   (As Of: 11/15/2021 2:41:56 PM CST)   Alcohol:  Current      Wine (5 oz), Daily, 1 drinks/episode average.  Ready to change: No.   (Last Updated: 11/6/2019 11:39:08 AM CST by Char Sharma)          Tobacco:  Past      Past   (Last Updated: 6/25/2012 4:51:04 PM CDT by Brittany Singh MA)   Quit in 1 982, Cigarettes, 4 per day.  Total pack years: 15.   (Last Updated: 11/15/2021 2:30:36 PM CST by Venice Heard LPN)          Electronic Cigarette/Vaping:        Electronic Cigarette Use: Never.   (Last Updated: 11/15/2021 2:30:41 PM CST by Venice Heard LPN)          Substance Abuse:  Denies Substance Abuse      Never   (Last Updated: 6/25/2012 4:51:08 PM CDT by Francisco GOLDBERG, Brittany)          Employment/School:        Retired,  Highest education level: Master's.   (Last Updated: 11/6/2019 11:40:11 AM CST by Char Sharma)          Home/Environment:        Marital status: .  Spouse/Partner name: Tomy.  Lives with Spouse.  3 children.  Living situation: Home/Independent.  Injuries/Abuse/Neglect in household: No.  Feels unsafe at home: No.  Family/Friends available for support: Yes.   (Last Updated: 11/6/2019 11:38:21 AM CST by Char Sharma)          Nutrition/Health:        Type of diet: Regular.  Wants to lose weight: No.  Sleeping concerns: No.  Feels highly stressed: No.   (Last Updated: 11/6/2019 11:39:20 AM CST by Char Sharma)          Exercise:        Exercise frequency: 3-4 times/week.  Exercise type: Aerobics, Yoga.   (Last Updated: 11/6/2019 11:39:41 AM CST by Char Sharma)          Sexual:        Sexually active: No.  Identifies as female   (Last Updated: 11/13/2020 10:23:50 AM CST by Shahnaz Chavez)            Depression Screening   Little Interest - Pleasure in Activities :   Not at all   Feeling Down, Depressed, Hopeless :   Not at all   Initial Depression Screen Score :   0 Score   Venice Heard LPN - 11/15/2021 2:29 PM CST   Geriatric Depression Screening   Geriatric Depression Satisfied Life :   Yes   Geriatric Depression Dropped Activities :   No   Geriatric Depression Life Empty :   No   Geriatric Depression Bored :   No   Geriatric Depression Good Spirits :   Yes   Geriatric Depression Afraid Bad Things :   No   Geriatric Depression Feel Happy :   Yes   Geriatric Depression Feel Helpless :   No   Geriatric Depression Prefer to Stay Home :   No   Geriatric Depression Memory Problems :   No   Geriatric Depression Wonderful Be Alive :   Yes   Geriatric Depression Feel Worthless :   No   Geriatric Depression Situation Hopeless :   No   Geriatric Depression Others Better Off :   No   Geriatric Depression Full of Energy :   Yes   Geriatric Depression Total Score :   0    Venice Heard LPN - 11/15/2021 2:29  PM CST   Hearing and Vision Screening   Audiogram Result Right Ear :   Pass   Audiogram Result Left Ear :   Pass   Visual Acuity Evaluated Left Eye :   Pass   Visual Acuity Evaluated Right Eye :   Pass   Venice Heard LPN 11/15/2021 2:29 PM CST   Home Safety Screen   Emergency Numbers Kept/Updated :   Yes   Aware of Smoking Dangers :   Yes   Smoke Alarms/Fire Extinguisher Available :   Yes   Household Members Fire Safety Knowledge :   Yes   Floor Rugs Removed or Fastened :   No   Mats in Bathtub/Shower :   Yes   Stairway Rails or Banisters :   Yes   Outdoor Clutter Safety :   Yes   Indoor Clutter Safety :   Yes   Electrical Cord Safety :   Yes   Venice Heard LPN 11/15/2021 2:29 PM CST   Advance Directives   Advance Directive :   Yes   Venice Heard LPN 11/15/2021 2:29 PM CST   Acosta Fall Risk   History of Fall in Last 3 Months Acosta :   No   Venice Heard LPN 11/15/2021 2:29 PM CST   Functional Assessment   Focused Functional Assessment Grid   Bathing :   Independent (2)   Dressing :   Independent (2)   Toileting :   Independent (2)   Transferring Bed or Chair :   Independent (2)   Continence :   Independent (2)   Feeding :   Independent (2)   Venice Heard LPN 11/15/2021 2:29 PM CST   ADL Index Score :   12    Capable of Shopping :   Yes   Capable of Walking :   Yes   Capable of Housekeeping :   Yes   Capable of Managing Medications :   Yes   Capable of Handling Finances :   Yes   Venice Heard LPN 11/15/2021 2:29 PM CST

## 2022-02-16 NOTE — TELEPHONE ENCOUNTER
---------------------  From: Ashlyn VALENZUELA, Andra ATYLOR (Phone Messages [place] ([id number]_[facility]))   To: PETEY AZAR    Sent: 5/22/2020 8:39:22 AM CDT  Subject: FW: Message for Dr. Herrera Baker,    Dr. Silverman is out of clinic until Tuesday. Looking at the last notes in your chart he did want you to follow up if your symptoms returned. If you would like to discuss this with another provider today you can call us to set up a phone or video visit.    Thanks,  Andra NEGRO LPN        ---------------------  From: PETEY AZAR  To: [facility]  Sent: 05/22/2020 08:21 a.m. CDT  Subject: Message for Dr. Silverman  Symptoms have returned...pain and stiffness in neck, upper arms, and upper legs.  Would like to consider going back on prednisone.

## 2022-02-16 NOTE — TELEPHONE ENCOUNTER
---------------------  From: Josh Silverman MD   Sent: 5/7/2020 1:03:59 PM CDT  Subject: Patient Message - Results Notification        Patient notified of normal lab tests.  Will treat as inflammatory condition with prednisone 20 mg qd for a week.  Discussed side effects of the medication.  Follow up in one week.

## 2022-02-16 NOTE — NURSING NOTE
Medicare Visit Entered On:  11/5/2019 3:11 PM CST    Performed On:  11/5/2019 3:04 PM CST by Nancy Nolan CMA               Summary   Chief Complaint :   AWV   Advance Directive :   Yes   Menstrual Status :   Postmenopausal   Weight Measured :   148 lb(Converted to: 148 lb 0 oz, 67.13 kg)    Height Measured :   66 in(Converted to: 5 ft 6 in, 167.64 cm)    Body Mass Index :   23.89 kg/m2   Body Surface Area :   1.77 m2   Systolic Blood Pressure :   128 mmHg   Diastolic Blood Pressure :   70 mmHg   Mean Arterial Pressure :   89 mmHg   Peripheral Pulse Rate :   68 bpm   BP Site :   Left arm   Pulse Site :   Radial artery   BP Method :   Manual   HR Method :   Manual   Temperature Tympanic :   98.4 DegF(Converted to: 36.9 DegC)    Nancy Nolan CMA - 11/5/2019 3:04 PM CST   Health Status   Allergies Verified? :   Yes   Medication History Verified? :   Yes   Immunizations Current :   Yes   Pre-Visit Planning Status :   Completed   Tobacco Use? :   Former smoker   Nancy Nolan CMA - 11/5/2019 3:04 PM CST   Consents   Consent for Immunization Exchange :   Consent Granted   Consent for Immunizations to Providers :   Consent Granted   Nancy Nolan CMA - 11/5/2019 3:04 PM CST   Meds / Allergies   (As Of: 11/5/2019 3:11:24 PM CST)   Allergies (Active)   adhesive tape  Estimated Onset Date:   Unspecified ; Reactions:   Rash ; Created By:   Brittany Rey; Reaction Status:   Active ; Category:   Drug ; Substance:   adhesive tape ; Type:   Allergy ; Updated By:   Brittany Rey; Reviewed Date:   5/15/2017 2:54 PM CDT      Cipro  Estimated Onset Date:   Unspecified ; Reactions:   joint pain ; Created By:   Brittany Singh MA; Reaction Status:   Active ; Category:   Drug ; Substance:   Cipro ; Type:   Allergy ; Updated By:   Brittany Singh MA; Source:   Patient ; Reviewed Date:   5/15/2017 2:54 PM CDT      doxycycline  Estimated Onset Date:   Unspecified ; Reactions:   systemic rxn ; Created By:   Brittany Singh MA;  Reaction Status:   Active ; Category:   Drug ; Substance:   doxycycline ; Type:   Allergy ; Updated By:   Brittany Singh MA; Source:   Patient ; Reviewed Date:   5/15/2017 2:54 PM CDT      Dust  Estimated Onset Date:   Unspecified ; Created By:   Brittany Rey; Reaction Status:   Active ; Category:   Environment ; Substance:   Dust ; Type:   Allergy ; Updated By:   Brittany Rey; Reviewed Date:   5/15/2017 2:54 PM CDT      Mold  Estimated Onset Date:   Unspecified ; Created By:   Brittany Rey; Reaction Status:   Active ; Category:   Environment ; Substance:   Mold ; Type:   Allergy ; Updated By:   Brittany Rey; Reviewed Date:   5/15/2017 2:54 PM CDT      Pollen  Estimated Onset Date:   Unspecified ; Created By:   Brittany Rey; Reaction Status:   Active ; Category:   Environment ; Substance:   Pollen ; Type:   Allergy ; Updated By:   Brittany Rey; Reviewed Date:   5/15/2017 2:54 PM CDT      Strawberries  Estimated Onset Date:   Unspecified ; Created By:   Brittany Rey; Reaction Status:   Active ; Category:   Food ; Substance:   Strawberries ; Type:   Allergy ; Updated By:   Brittany Rey; Reviewed Date:   5/15/2017 2:54 PM CDT      sulfa drug  Estimated Onset Date:   Unspecified ; Reactions:   Rash, Difficulty breathing ; Created By:   Brittany Rey; Reaction Status:   Active ; Category:   Drug ; Substance:   sulfa drug ; Type:   Allergy ; Updated By:   Brittany Rey; Source:   Paper Chart ; Reviewed Date:   5/15/2017 2:54 PM CDT        Medication List   (As Of: 11/5/2019 3:11:24 PM CST)   Prescription/Discharge Order    albuterol  :   albuterol ; Status:   Prescribed ; Ordered As Mnemonic:   ProAir HFA 90 mcg/inh inhalation aerosol ; Simple Display Line:   2 puff(s), Inhale, q4 hrs, PRN: as needed for wheezing, 1 EA, 11 Refill(s) ; Ordering Provider:   Josh Silverman MD; Catalog Code:   albuterol ; Order Dt/Tm:   10/11/2018 12:19:25 PM CDT          azithromycin  :   azithromycin ; Status:    Completed ; Ordered As Mnemonic:   Azithromycin 5 Day Dose Pack 250 mg oral tablet ; Simple Display Line:   See Instructions, 2 tabs day 1 and then 1 tab days 2-5, 6 tab(s), 0 Refill(s) ; Ordering Provider:   Josh Silverman MD; Catalog Code:   azithromycin ; Order Dt/Tm:   2/19/2019 2:53:24 PM CST          fluticasone-salmeterol  :   fluticasone-salmeterol ; Status:   Prescribed ; Ordered As Mnemonic:   Advair Diskus 100 mcg-50 mcg inhalation powder ; Simple Display Line:   1 puff(s), inh, bid, 1 EA, 11 Refill(s) ; Ordering Provider:   Josh Silverman MD; Catalog Code:   fluticasone-salmeterol ; Order Dt/Tm:   10/11/2018 12:19:26 PM CDT          Miscellaneous Rx Supply  :   Miscellaneous Rx Supply ; Status:   Prescribed ; Ordered As Mnemonic:   compression stockings ; Simple Display Line:   See Instructions, 30-40mm Hg  459.81  chronic venous insuffiency  prevents leg pain and swelling, 2 EA ; Ordering Provider:   Josh Silverman MD; Catalog Code:   Miscellaneous Rx Supply ; Order Dt/Tm:   6/25/2012 2:41:39 PM CDT            Home Meds    fluticasone nasal  :   fluticasone nasal ; Status:   Documented ; Ordered As Mnemonic:   Flonase 50 mcg/inh nasal spray ; Simple Display Line:   2 spray(s), Nasal, daily, 0 Refill(s) ; Catalog Code:   fluticasone nasal ; Order Dt/Tm:   11/5/2019 3:09:23 PM CST          multivitamin  :   multivitamin ; Status:   Documented ; Ordered As Mnemonic:   Multiple Vitamins oral tablet ; Simple Display Line:   1 tab(s), PO, daily ; Catalog Code:   multivitamin ; Order Dt/Tm:   6/25/2012 2:16:21 PM CDT          multivitamin with minerals  :   multivitamin with minerals ; Status:   Documented ; Ordered As Mnemonic:   PreserVision AREDS 2 ; Simple Display Line:   1 cap(s), po, daily, 0 Refill(s) ; Catalog Code:   multivitamin with minerals ; Order Dt/Tm:   7/14/2016 9:23:00 AM CDT          omega-3 polyunsaturated fatty acids  :   omega-3 polyunsaturated fatty acids ; Status:    Documented ; Ordered As Mnemonic:   Fish Oil ; Simple Display Line:   po, 0 Refill(s) ; Catalog Code:   omega-3 polyunsaturated fatty acids ; Order Dt/Tm:   9/15/2015 3:07:24 PM CDT          polyethylene glycol 3350  :   polyethylene glycol 3350 ; Status:   Documented ; Ordered As Mnemonic:   MiraLax ; Simple Display Line:   17 gm, po, daily ; Catalog Code:   polyethylene glycol 3350 ; Order Dt/Tm:   10/3/2011 11:54:08 AM CDT            Geriatric Depression Screening   Geriatric Depression Satisfied Life :   Yes   Geriatric Depression Dropped Activities :   No   Geriatric Depression Life Empty :   No   Geriatric Depression Bored :   No   Geriatric Depression Good Spirits :   Yes   Geriatric Depression Afraid Bad Things :   No   Geriatric Depression Feel Happy :   Yes   Geriatric Depression Feel Helpless :   No   Geriatric Depression Prefer to Stay Home :   No   Geriatric Depression Memory Problems :   No   Geriatric Depression Wonderful Be Alive :   Yes   Geriatric Depression Feel Worthless :   No   Geriatric Depression Situation Hopeless :   No   Geriatric Depression Others Better Off :   No   Geriatric Depression Full of Energy :   Yes   Geriatric Depression Total Score :   0    Nancy Nolan CMA - 11/5/2019 3:04 PM CST   Home Safety Screen   Emergency Numbers Kept/Updated :   Yes   Aware of Smoking Dangers :   Yes   Smoke Alarms/Fire Extinguisher Available :   Yes   Household Members Fire Safety Knowledge :   Yes   Floor Rugs Removed or Fastened :   No   Mats in Bathtub/Shower :   Yes   Stairway Rails or Banisters :   Yes   Outdoor Clutter Safety :   Yes   Indoor Clutter Safety :   Yes   Electrical Cord Safety :   Yes   Nancy Nolan CMA - 11/5/2019 3:04 PM CST   Ac[place] Fall Risk   History of Fall in Last 3 Months Acosta :   Yes   Nancy Nolan CMA - 11/5/2019 3:04 PM CST   Functional Assessment   Focused Functional Assessment Grid   Bathing :   Independent (2)   Dressing :   Independent (2)   Toileting :    Independent (2)   Transferring Bed or Chair :   Independent (2)   Feeding :   Independent (2)   Nancy Nolan CMA - 11/5/2019 3:04 PM CST   Capable of Shopping :   Yes   Capable of Walking :   Yes   Capable of Housekeeping :   Yes   Capable of Managing Medications :   Yes   Capable of Handling Finances :   Yes   Nancy Nolan CMA - 11/5/2019 3:04 PM CST

## 2022-02-16 NOTE — NURSING NOTE
Comprehensive Intake Entered On:  5/5/2020 7:55 AM CDT    Performed On:  5/5/2020 7:53 AM CDT by Ira Posada CMA               Summary   Chief Complaint :   c/o stiff neck, upper arm pains(BL) x 7-10 days.  No known injury.  Has been using ibuprofen 400mg q4hrs with no relief.  Pain is constant - verbal consent given for telemed visit   Advance Directive :   Yes   Menstrual Status :   Postmenopausal   Height Measured :   66 in(Converted to: 5 ft 6 in, 167.64 cm)    Ira Posada CMA - 5/5/2020 7:53 AM CDT   Health Status   Allergies Verified? :   Yes   Medication History Verified? :   Yes   Immunizations Current :   Yes   Medical History Verified? :   Yes   Pre-Visit Planning Status :   Completed   Ira Posada CMA - 5/5/2020 7:53 AM CDT   ID Risk Screen   Recent Travel History :   No recent travel   Family Member Travel History :   No recent travel   Other Exposure to Infectious Disease :   Unknown   Ira Posada CMA - 5/5/2020 7:53 AM CDT

## 2022-02-16 NOTE — TELEPHONE ENCOUNTER
---------------------  From: Venice Heard LPN   To: PETEY AZAR    Sent: 5/26/2020 8:51:05 AM CDT  Subject: General Message     Good Morning Dr Herrera Baker advises a repeat Sed Rate labs and to resume Prednisone 10mg daily for the next two weeks. A prescription was sent into the pharmacy for you and you can contact the clinic to set up a lab only appointment. Please contact clinic if you have any questions.   Kind Regards,   Venice VALENZUELA   Awake/Alert/Irritable

## 2022-02-16 NOTE — TELEPHONE ENCOUNTER
---------------------  From: Andra Goldberg LPN (Phone Messages Pool ([id number]_[facility]))   To: [employer] Message Pool ([id number]); PETEY AZAR    Sent: 5/22/2020 9:13:13 AM CDT  Subject: CONSUMER MESSAGE FW: FW: Message for Dr. Silverman     I can forward this message to him so you don't have to send another one.  Thanks,  Andra NEGRO LPN        ---------------------  From: PETEY AZAR  To: [facility]  Sent: 05/22/2020 09:02 a.m. CDT  Subject: RE: FW: Message for Dr. Silverman  I ll wait to talk to .  Should I send another message on Tuesday?  ---------------------  From: Andar Goldberg LPN (Phone Messages Pool ([id number]))  To: PETEY AZAR  Sent: 5/22/2020 8:39:22 AM CDT  Subject: FW: Message for Dr. Silverman       Hi Dr. Herrera Baker is out of clinic until Tuesday. Looking at the last notes in your chart he did want you to follow up if your symptoms returned. If you would like to discuss this with another provider today you can call us to set up a phone or video visit.       Thanks,  Andra NEGRO LPN                 ---------------------  From: PETEY AZAR  To: [facility]  Sent: 05/22/2020 08:21 a.m. CDT  Subject: Message for Dr. Silverman  Symptoms have returned...pain and stiffness in neck, upper arms, and upper legs.  Would like to consider going back on prednisone.---------------------  From: Venice Heard LPN ([employer] Message Pool ([id number]))   To: Josh Silverman MD;     Sent: 5/26/2020 8:14:06 AM CDT  Subject: FW: CONSUMER MESSAGE FW: FW: Message for Dr. Silverman---------------------  From: Josh Silverman MD   To: [employer] Message Pool ([id number]_[place] [facility] [place]);     Sent: 5/26/2020 8:41:59 AM CDT  Subject: RE: CONSUMER MESSAGE FW: FW: Message for Dr. Silverman     advise a repeat ESR and resume prednisone 10 mg a day for the next 2 weeks.patient notified via portal, medication sent and RTC  placed for lab visit.

## 2022-02-16 NOTE — LETTER
(Inserted Image. Unable to display)   June 19, 2020      PETEY AZAR  [address], [place] [phone]        Dear PETEY,      Thank you for selecting [facility] (previously [facility]) for your healthcare needs.       X-rays reveal mild osteoarthritic changes of multiple joints. No evidence of inflammatory arthritis. This would not appear to me to explain your rather severe symptoms. I will be intouch with lab results.          Please contact me or my assistant at [phone] you have any questions or concerns.     Sincerely,        Jan Felix MD

## 2022-02-16 NOTE — TELEPHONE ENCOUNTER
---------------------  From: Susannah Denson RN (Phone Messages Pool ([address] [place] [address]))   To: [facility] Message Pool ([address] [place] [address]);     Sent: 5/12/2020 10:28:05 AM CDT  Subject: FW: report for Dr. Silverman on new medication     Does pt need TeleMed this week for follow up?      ---------------------  From: PETEY AZAR  To: [facility]  Sent: 05/12/2020 10:25 a.m. CDT  Subject: report for Dr. Silverman on new medication  Am feeling much better...@80%.  Prednisone seems to be working.  What is the next step?  Petey Azar---------------------  From: Venice Heard LPN ([employer] Pool ([address] [place] [address]))   To: Josh Silverman MD;     Sent: 5/12/2020 11:52:07 AM CDT  Subject: FW: report for Dr. Silverman on new medication---------------------  From: Josh Silverman MD   To: [employer] Pool ([address] [place] [address]);     Sent: 5/12/2020 4:50:43 PM CDT  Subject: RE: report for Dr. Silverman on new medication     Finish course of prednisone and follow up if symptoms return or do not resolve.---------------------  From: Venice Heard LPN ([employer] Pool ([address] [place] [address]))   To: PETEY AZAR    Sent: 5/12/2020 4:56:52 PM CDT  Subject: FW: report for Dr. Silverman on new medication

## 2022-02-16 NOTE — PROGRESS NOTES
Chief Complaint    Patient c/o ongoing pain in neck and upper arms. Also, hip and thigh pain. States the Prednisone helped. Verbal consent granted for video visit.   Patient is being evaluated via a billable video visit.   This video visit will be conducted via a call between you and your physician.   The patient has been notified of the following:   Today's visit was conducted via video due to the COVID-19 pandemic. Patient's consent to video visit was obtained and documented.  We have found that certain health care needs can be provided without need for a physical exam.  This service lets us provide the care your need with a short video conversation.  If a prescription is necessary, we can send it directly to your pharmacy.  If lab work is needed, we can place an order in your chart and you can schedule an appointment for the test/tests at a later time   Call Start Time: 1410   Call End Time: 1426  History of Present Illness      Patient continues have myalgia.  She was initially treated with prednisone on May 5.  She started feeling better in a couple days.  Symptoms returned a few days after stopping prednisone.  This was restarted again on a 10 mg daily for 14 days on May 28.  Symptoms returned a few days after stopping the steroid.  Her work-up to this point has been negative.  Inflammatory markers are normal.  She complains of pain and stiffness in her neck shoulders and upper arms.  This morning she noticed some stiffness in her hands and feet.  She also has some pain in her thighs.  Pain is made worse with movement.  If she sits still she feels pretty well.  There have been no recent changes in medication or activity.  She denies recent infection, fever, chills, sweats, infectious exposure  Review of Systems      See HPI.  All other review of systems negative.  Physical Exam   Vitals & Measurements    HT: 66 in       She appears well, alert, oriented  Assessment/Plan       1. Myalgia (M79.10)        Initial  differential was polymyalgia rheumatica versus myositis versus other inflammatory muscular disease.  This was a bit unlikely given her negative inflammatory markers and work-up to date.  Is curious that she improved significantly with steroids.  She reports her symptoms are pretty much gone with steroids.  Also in the differential is seronegative rheumatoid arthritis.        I have recommended internal medicine consultation with Dr. Cortes.  Hopefully this can scheduled in the future.  She will stay off prednisone until she has the consultation.         Ordered:          Return to Clinic (Request), RFV: IM consultation with Dr. Felix           Patient Information     Name:PETEY AZAR      Address:      [address] [place] [phone]     Sex:Female     YOB: 1945     [phone]     Emergency Contact:RELL AZAR     MRN:[id number]     FIN:[id number]     Location:[facility]     Date of Service:06/16/2020      Primary Care Physician:       Josh Silverman MD, [phone]      Attending Physician:       Josh Silverman MD, [phone]  Problem List/Past Medical History    Ongoing     Asthma     Chronic venous insufficiency     Mild intermittent asthma     Osteoarthritis of CMC joint of thumb     Osteopenia of the elderly     Seasonal allergies    Historical     Chicken pox     Pregnancy     Pregnancy     Pregnancy     Tobacco abuse       Comments: quit  Procedure/Surgical History     Extracapsular cataract extraction and insertion of intraocular lens (10/05/2017)      Comments: Left..     Extracapsular cataract extraction and insertion of intraocular lens (09/21/2017)      Comments: Right..     Colonoscopy (10/21/2016)      Comments: Indication:   screening      Sedation:   Midazolam 3mg, Fentanyl 150mcg--IV      Findings:  no polyps/abnormalities seen      Recommendation:   f/u only as needed..     Cholecystectomy with cholangiography (06/11/2013)      Colonoscopy (02/08/2006)      Comments: diverticulosis.    rpt 02/2009. Procedure done in [place].  Repeat in 3 years with yearly hemoccults..     Hernia repair (07/01/1994)      Comments: Right femoral hernia.     Breast biopsy and related procedures      Comments: benign.     cyst removal     Knee replacement     Tonsillectomy     venous ligation      Comments: both legs..  Medications    Advair Diskus 100 mcg-50 mcg inhalation powder, 1 puff(s), Inhale, bid, 11 refills    calcium (as carbonate) 600 mg oral tablet, 1200 mg= 2 tab(s), Oral, daily    compression stockings, See Instructions, 5 refills    Fish Oil, Oral    Flonase 50 mcg/inh nasal spray, 100 mcg= 2 spray(s), Nasal, daily, 11 refills    MiraLax, 17 gm, Oral, daily    Multiple Vitamins oral tablet, 1 tab(s), Oral, daily    PreserVision AREDS 2, 1 cap(s), Oral, daily    ProAir HFA 90 mcg/inh inhalation aerosol, 2 puff(s), Inhale, q4 hrs, PRN, 11 refills  Allergies    Cipro (joint pain)    Dust    Mold    Pollen    Strawberries    adhesive tape (Rash)    doxycycline (systemic rxn)    sulfa drug (Difficulty breathing, Rash)  Social History    Smoking Status - 06/16/2020     Never smoker     Alcohol - Current, 11/06/2019      Wine (5 oz), Daily, 1 drinks/episode average. Ready to change: No., 11/06/2019     Employment/School      Retired, Highest education level: Master's., 11/06/2019     Exercise      Exercise frequency: 3-4 times/week. Exercise type: Aerobics, Yoga., 11/06/2019     Home/Environment      Marital status: . Spouse/Partner name: Tomy. Lives with Spouse. 3 children. Living situation: Home/Independent. Injuries/Abuse/Neglect in household: No. Feels unsafe at home: No. Family/Friends available for support: Yes., 11/06/2019     Nutrition/Health      Type of diet: Regular. Wants to lose weight: No. Sleeping concerns: No. Feels highly stressed: No., 11/06/2019     Sexual      Sexually active: No., 09/17/2015     Substance Abuse - Denies Substance  Abuse, 11/06/2019      Never, 06/25/2012     Tobacco - Past, 11/06/2019      Quit in 1 982, Cigarettes, 4 per day. Total pack years: 15., 11/06/2019      Past, 06/25/2012  Family History    Alcoholism: Father.    Allergic rhinitis: Mother.    Arthritis: Mother and Father.    Breast cancer: Grandmother (P).    CA - Cancer of colon: Father.    Cancer: Negative: Daughter.    Crohns disease...: Daughter.    Dementia: Mother.    Kidney failure...: Father.    Sarcoidosis...: Mother.    Sister: History is unknown    Brother: History is unknown    Sister: History is unknown  Immunizations      Vaccine Date Status          influenza virus vaccine, inactivated 10/01/2019 Recorded          zoster vaccine, inactivated 06/14/2019 Recorded          zoster vaccine, inactivated 04/15/2019 Recorded          typhoid, inactivated 02/19/2019 Given          influenza virus vaccine, inactivated 10/11/2018 Given          influenza virus vaccine, inactivated 10/09/2017 Given          tetanus/diphth/pertuss (Tdap) adult/adol 10/09/2017 Recorded          influenza virus vaccine, inactivated 09/21/2016 Given          influenza virus vaccine, inactivated 10/19/2015 Given          influenza virus vaccine, inactivated 10/14/2014 Given          pneumococcal (PCV13) 10/14/2014 Given          typhoid, inactivated 02/10/2014 Given          influenza virus vaccine, inactivated 10/22/2013 Recorded          pneumococcal (PPSV23) 10/03/2011 Given          influenza virus vaccine, inactivated 10/03/2011 Given          influenza virus vaccine, inactivated 09/20/2010 Given          influenza, H1N1, inactivated 11/03/2009 Recorded          typhoid, inactivated 04/27/2009 Recorded          ZOS, shingles 04/27/2009 Recorded          tetanus/diphth/pertuss (Tdap) adult/adol 04/21/2008 Recorded          Hep A 07/01/1999 Recorded          typhoid, inactivated 11/01/1998 Recorded          Hep A 11/01/1998 Recorded          yellow fever 11/01/1998 Recorded  Lab  Results       Lab Results (Last 4 results within 90 days)        Sodium Level: 138 mmol/L [135 mmol/L - 146 mmol/L] (05/05/20 15:51:00)       Potassium Level: 4 mmol/L [3.5 mmol/L - 5.3 mmol/L] (05/05/20 15:51:00)       Chloride Level: 103 mmol/L [98 mmol/L - 110 mmol/L] (05/05/20 15:51:00)       CO2 Level: 29 mmol/L [20 mmol/L - 32 mmol/L] (05/05/20 15:51:00)       Glucose Level: 102 mg/dL High [65 mg/dL - 99 mg/dL] (05/05/20 15:51:00)       BUN: 20 mg/dL [7 mg/dL - 25 mg/dL] (05/05/20 15:51:00)       Creatinine Level: 0.87 mg/dL [0.6 mg/dL - 0.93 mg/dL] (05/05/20 15:51:00)       BUN/Creat Ratio: NOT APPLICABLE [6  - 22] (05/05/20 15:51:00)       eGFR: 66 mL/min/1.73m2 (05/05/20 15:51:00)       eGFR : 76 mL/min/1.73m2 (05/05/20 15:51:00)       Calcium Level: 9.8 mg/dL [8.6 mg/dL - 10.4 mg/dL] (05/05/20 15:51:00)       C-Reactive Protein (CRP): 7.3 mg/L (05/05/20 15:51:00)       Total CK: 64 unit/L [29 unit/L - 143 unit/L] (05/05/20 15:51:00)       TSH: 2.36 mIU/L [0.4 mIU/L - 4.5 mIU/L] (05/05/20 15:51:00)       WBC: 5.8 [3.8  - 10.8] (05/05/20 15:51:00)       RBC: 4.21 [3.8  - 5.1] (05/05/20 15:51:00)       Hgb: 13 gm/dL [11.7 gm/dL - 15.5 gm/dL] (05/05/20 15:51:00)       Hct: 37.2 % [35 % - 45 %] (05/05/20 15:51:00)       MCV: 88.4 fL [80 fL - 100 fL] (05/05/20 15:51:00)       MCH: 30.9 pg [27 pg - 33 pg] (05/05/20 15:51:00)       MCHC: 34.9 gm/dL [32 gm/dL - 36 gm/dL] (05/05/20 15:51:00)       RDW: 12.5 % [11 % - 15 %] (05/05/20 15:51:00)       Platelet: 298 [140  - 400] (05/05/20 15:51:00)       MPV: 10.1 fL [7.5 fL - 12.5 fL] (05/05/20 15:51:00)       Sed Rate: 9 (05/28/20 14:41:00)       Sed Rate: 14 (05/05/20 15:51:00)

## 2022-02-16 NOTE — TELEPHONE ENCOUNTER
---------------------  From: Josh Silverman MD   To: PETEY AZAR    Sent: 12/1/2021 8:28:48 PM CST  Subject: Patient Message - Results Notification      Your bone density results indicate:  _   Normal bone density  x   Osteopenia in hip/spine (mild bone thinning   not osteoporosis)  _   Osteoporosis in hip/spine  Our recommendation is:  _   Schedule an appointment with your health care provider to discuss your results.     x   Continue current regimen    x   Calcium    Recommended daily amounts for men and women are:  o Men age 50 - 69  1000 mg  o Men age 70+  1200 mg  o Women age 50+  1200 mg  Vitamin D    800 - 1000 IU daily  Weight bearing Exercise    30 minutes or more several times a week  Avoid excess alcohol and smoking  Prevent falling    Avoid excessive sedation or hypotension (low blood pressure)    Improve strength    Decrease or remove environmental hazards    x   Repeat bone density in 2 year(s)    Your bone density has improved.  I recommend continuing alendronate and repeating a bone density in two years.

## 2022-02-16 NOTE — PROGRESS NOTES
Patient:   PETEY AZAR            MRN: [id number]            FIN: [id number]               Age:   71 years     Sex:  Female     :  1945   Associated Diagnoses:   Left inguinal hernia   Author:   Josh Silverman MD      Visit Information      Date of Service: 2017 08:22 am  Performing Location: [facility]  Encounter#: [id number]      Primary Care Provider (PCP):  Josh Silverman MD    NPI# [id number]      Chief Complaint   4/3/2017 8:24 AM CDT     c/o lumb on left groin, severe pain since Friday.  denies fever or vomiting.  was seen 17 and rx Ketorolac and Zofran--not taking meds.      History of Present Illness   Patient is here for new onset lump on left groin.  She was complaining of having severe abdominal pain Friday night into Saturday as well as nausea.  The pain started in the left groin on/off over the past year and extending up to left side.  She thought at first she was having a kidney stone.  The pain got worse Friday and into Saturday.  She was seen 17 for her abdominal pain and had a full workup done, in which everything was normal.  She was then put on Zofran and Ketorolac but she hasn't taken any since her visit as her pain has improved.  She had a normal colonoscopy 10/21/16.  She felt pain when she would have a BM.  The lump is very prominent now but was not present on her visit 17.  Her abdominal pain is much improved since her last visit.      Review of Systems   Constitutional:  No fever, No chills.    Respiratory:  Negative.    Cardiovascular:  Negative.    Gastrointestinal:  No nausea, No vomiting, No diarrhea, No constipation, No abdominal pain.    Immunologic:  No recurrent fevers, No recurrent infections.    Neurologic:  Alert and oriented X4.             Health Status   Allergies:    Allergic Reactions (Selected)  Severity Not Documented  Adhesive tape (Rash)  Cipro (Joint pain)  Doxycycline (Systemic rxn)  Dust (No reactions were  documented)  Mold (No reactions were documented)  Pollen (No reactions were documented)  Strawberries (No reactions were documented)  Sulfa drug (Rash and difficulty breathing)   Medications:  (Selected)   Prescriptions  Prescribed  Advair Diskus 100 mcg-50 mcg inhalation powder: 1 puff(s), inh, bid, # 60 EA, 11 Refill(s), Type: Maintenance, [facility] 1 puff(s) inh bid  albuterol CFC free 90 mcg/inh inhalation aerosol: 2 puff(s), inh, q4 hrs, # 1 EA, 11 Refill(s), Type: Maintenance, [facility], 2 puff(s) inh q4 hrs  compression stockings: compression stockings, See Instructions, Instructions: 30-40mm Hg  459.81  chronic venous insuffiency  prevents leg pain and swelling, # 2 EA, 5 Refill(s), Type: Soft Stop  fluticasone 50 mcg/inh nasal spray: 2 spray(s), nasal, daily, # 1 EA, 11 Refill(s), [facility], 2 spray(s) nasal daily  Documented Medications  Documented  Fish Oil: po, 0 Refill(s), Type: Maintenance  MiraLax: ( 17 gm ), po, daily, 0 Refill(s), Type: Maintenance  Multiple Vitamins oral tablet: 1 tab(s), PO, daily, 0 Refill(s), Type: Soft Stop  PreserVision AREDS 2: 1 cap(s), po, daily, 0 Refill(s), Type: Maintenance   Problem list:    All Problems (Selected)  Seasonal Allergies / ICD-9-.9 / Confirmed  Osteoarthritis of CMC joint of thumb / SNOMED CT 802660053 / Confirmed  Osteopenia of the Elderly / ICD-9-.90 / Confirmed  Mild intermittent asthma / SNOMED CT 7681119039 / Confirmed  Asthma / SNOMED CT 3573178552 / Confirmed  Chronic Venous Insufficiency / ICD-9-.81 / Confirmed      Histories   Past Medical History:    Active  Chronic Venous Insufficiency (459.81): Onset on 6/25/2012 at 66 years.  Asthma (4243137786)  Osteopenia of the Elderly (733.90)  Seasonal Allergies (477.9)  Resolved  Tobacco abuse (347913444):  Resolved.  Comments:  5/3/2010 CDT 9:31 AM CDT - Mira Marion CMA  quit  Pregnancy  (441270813):  Resolved in  at 26 years.  Pregnancy (671707144):  Resolved in  at 28 years.  Pregnancy (871137365):  Resolved in  at 34 years.  Chicken Pox (052.9):  Resolved.   Family History:    Dementia  Mother ()  Kidney failure...  Father ()  Sarcoidosis...  Mother ()  Crohns disease...  Daughter (Milady)     Procedure history:    Colonoscopy (131270630) on 10/21/2016 at 70 Years.  Comments:  10/24/2016 2:22 PM - Brittany Singh MA  Indication:   screening  Sedation:   Midazolam 3mg, Fentanyl 150mcg--IV  Findings:  no polyps/abnormalities seen  Recommendation:   f/u only as needed.  Cholecystectomy with cholangiography (495688937) on 2013 at 67 Years.  Colonoscopy (307344400) on 2006 at 60 Years.  Comments:  2013 3:31 PM - Talia Llanes CMA  Procedure done in [place].  Repeat in 3 years with yearly hemoccults.    2012 9:28 AM - Brittany Singh MA  diverticulosis.    rpt 2009  Hernia repair (64277441) on 1994 at 48 Years.  Comments:  2013 12:42 PM - Brittany Rey  Right femoral hernia  Tonsillectomy (631432289).  Breast biopsy and related procedures (870496462).  Comments:  5/3/2010 9:28 AM - Mira Wu  benign  cyst removal.  Knee replacement (517047514).  venous ligation.  Comments:  2012 4:52 PM - Brittany Singh MA  both legs.   Social History:        Alcohol Assessment            Current, Wine (5 oz), Daily, 1 drinks/episode average.      Tobacco Assessment            Past      Substance Abuse Assessment            Never      Employment and Education Assessment            Retired      Home and Environment Assessment            Marital status: .  Spouse/Partner name: Tmoy.  Lives with Spouse.  3 children.      Nutrition and Health Assessment            Type of diet: Regular.      Exercise and Physical Activity Assessment: Regular exercise            Exercise frequency: 3-4 times/week.  Exercise type: Aerobics, Yoga.       Sexual Assessment            Sexually active: No.        Physical Examination   Vital Signs   4/3/2017 8:24 AM CDT Temperature Tympanic 97.9 DegF    Peripheral Pulse Rate 76 bpm    Pulse Site Radial artery    HR Method Manual    Systolic Blood Pressure 120 mmHg    Diastolic Blood Pressure 74 mmHg    Mean Arterial Pressure 89 mmHg    BP Site Right arm    BP Method Manual      Measurements from flowsheet : Measurements   4/3/2017 8:24 AM CDT Height Measured - Standard 65.2 in    Weight Measured - Standard 145.2 lb    BSA 1.74 m2    Body Mass Index 24.01 kg/m2      General:  Alert and oriented, No acute distress.    Eye:  Normal conjunctiva.    Gastrointestinal:  Soft, Non-tender,      Abdomen: Left, Inguinal hernia, Bulging, Firm, Not reducible, Not tender.    Integumentary:  Warm, Pink, Intact.    Neurologic:  Alert, Oriented.    Psychiatric:  Cooperative, Appropriate mood & affect, Normal judgment.       Impression and Plan   Diagnosis     Left inguinal hernia (VMW64-BW K40.90).     Course:  Worsening.    Plan:  The patient has a left inguinal hernia.  She was cautioned about possible obstruction and incarceration if it gets worse.  It is recommended that she see a general surgeon for consult.  A referral will be made..    Patient Instructions:  Inguinal Hernia, Adult.         Counseled: Patient, Regarding diagnosis, Regarding treatment, Verbalized understanding.    Orders     Orders (Selected)   Outpatient Orders  Completed  Referral (Request): 04/03/17 8:43:00 CDT, Referred to: General Surgery, Reason for referral: left inguinal hernia, Left inguinal hernia.     Brittany STRINGER MA, acted solely as a scribe for, and in the presence of Dr. Josh Silverman who performed the services.    IJosh MD, personally performed the services described in this documentation.  The documentation was scribed in my presence and is both accurate and complete.

## 2022-02-16 NOTE — TELEPHONE ENCOUNTER
Entered by Gabriella Barry CMA on November 01, 2019 7:57:45 PM CDT  ---------------------  From: Gabriella Barry CMA   To: [facility]    Sent: 11/1/2019 7:57:45 PM CDT  Subject: Medication Management     ** Not Approved: Patient needs appointment, Will refill at upcoming appt 11/5/19 **  fluticasone-salmeterol (ADVAIR DISKUS 100/50 MG INHALANT)  INHALE 1  PUFF TWICE A DAY  Qty:  60 unknown unit        Days Supply:  0        Refills:  0          Substitutions Allowed     Route To [facility]   Signed by Gabriella Barry CMA            ** Not Approved: Patient needs appointment, will refill at upcoming appt 11/5/19 **  fluticasone nasal (FLUTICASONE 50MCG SPR MG MIST(AEROSOL SPRAY))  USE 2 SPRAYS IN EACH NOSTRIL ONCE DAILY  Qty:  16 unknown unit        Days Supply:  0        Refills:  0          Substitutions Allowed     Route To [facility]   Signed by Gabriella Barry CMA            ** Patient matched by Gabriella Barry CMA on 11/1/2019 7:56:59 PM CDT **      ------------------------------------------  From: [facility]  To: Josh Silverman MD  Sent: November 1, 2019 6:09:20 PM CDT  Subject: Medication Management  Due: November 2, 2019 6:09:20 PM CDT    ** On Hold Pending Signature **  Drug: fluticasone-salmeterol (Advair Diskus 100 mcg-50 mcg inhalation powder)  INHALE 1  PUFF TWICE A DAY  Quantity: 60 unknown unit  Days Supply: 0  Refills: 0  Substitutions Allowed  Notes from Pharmacy:     Dispensed Drug: fluticasone-salmeterol (Advair Diskus 100 mcg-50 mcg inhalation powder)  INHALE 1  PUFF TWICE A DAY  Quantity: 60 unknown unit  Days Supply: 0  Refills: 0  Substitutions Allowed  Notes from Pharmacy:     ** On Hold Pending Signature **  Drug: fluticasone nasal (fluticasone 50 mcg/inh nasal spray)  USE 2 SPRAYS IN EACH NOSTRIL ONCE DAILY  Quantity: 16 unknown unit  Days Supply: 0  Refills: 0  Substitutions Allowed  Notes from  Pharmacy:     Dispensed Drug: fluticasone nasal (fluticasone 50 mcg/inh nasal spray)  USE 2 SPRAYS IN EACH NOSTRIL ONCE DAILY  Quantity: 16 unknown unit  Days Supply: 0  Refills: 0  Substitutions Allowed  Notes from Pharmacy:   ------------------------------------------

## 2022-02-16 NOTE — TELEPHONE ENCOUNTER
Order is sent to [facility] and they will contact patient.  Patient informed.

## 2022-02-16 NOTE — NURSING NOTE
Depression Screening Entered On:  11/12/2020 11:09 AM CST    Performed On:  11/12/2020 11:09 AM CST by Venice Heard LPN               Depression Screening   Little Interest - Pleasure in Activities :   Not at all   Feeling Down, Depressed, Hopeless :   Not at all   Initial Depression Screen Score :   0 Score   Venice Heard LPN - 11/12/2020 11:09 AM CST
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
## 2022-02-16 NOTE — NURSING NOTE
Dave Fall Risk Scale Score Entered On:  11/12/2020 11:08 AM CST    Performed On:  11/12/2020 11:08 AM CST by Venice Heard LPN Fall Risk   History of Fall in Last 3 Months Dave :   Venice Norman LPN - 11/12/2020 11:08 AM CST

## 2022-02-16 NOTE — LETTER
(Inserted Image. Unable to display)     November 27, 2020      PETEY AZAR  [address] [place], [place] [id number]          Dear PETEY,      Thank you for selecting [facility] (previously [facility]) for your healthcare needs.    Our records indicate you are due for the following services:     Non-Fasting Labs.    If you had your labs done at another facility or with Direct Access Lab Testing at [facility], please bring in a copy of the results to your next visit, mail a copy, or drop off a copy of your results to your Healthcare Provider.    (FYI   Regarding office visits: In some instances, a video visit or telephone visit may be offered as an option.)      To schedule an appointment or if you have further questions, please contact your primary clinic:   [facility]       [phone]   [facility]       [phone]              [facility]     [phone]      Powered by [employer]    Sincerely,    Josh Silverman MD

## 2022-02-16 NOTE — NURSING NOTE
Comprehensive Intake Entered On:  2/19/2019 2:29 PM CST    Performed On:  2/19/2019 2:23 PM CST by Brittany Singh MA               Summary   Chief Complaint :   travel px--[place] next month for mission trip.   Advance Directive :   Yes   Menstrual Status :   Postmenopausal   Weight Measured :   151.8 lb(Converted to: 151 lb 13 oz, 68.86 kg)    Height Measured :   65.5 in(Converted to: 5 ft 5 in, 166.37 cm)    Body Mass Index :   24.87 kg/m2   Body Surface Area :   1.78 m2   Systolic Blood Pressure :   128 mmHg   Diastolic Blood Pressure :   64 mmHg   Mean Arterial Pressure :   85 mmHg   Peripheral Pulse Rate :   68 bpm   BP Site :   Right arm   Pulse Site :   Radial artery   BP Method :   Manual   HR Method :   Manual   Temperature Tympanic :   98.4 DegF(Converted to: 36.9 DegC)    Brittany Singh MA - 2/19/2019 2:23 PM CST   Health Status   Allergies Verified? :   Yes   Medication History Verified? :   Yes   Immunizations Current :   Yes   Medical History Verified? :   Yes   Pre-Visit Planning Status :   Completed   Tobacco Use? :   Former smoker   Brittany Singh MA - 2/19/2019 2:23 PM CST   Consents   Consent for Immunization Exchange :   Consent Granted   Consent for Immunizations to Providers :   Consent Granted   Brittany Singh MA - 2/19/2019 2:23 PM CST   Meds / Allergies   (As Of: 2/19/2019 2:29:56 PM CST)   Allergies (Active)   adhesive tape  Estimated Onset Date:   Unspecified ; Reactions:   Rash ; Created By:   Brittany Rey; Reaction Status:   Active ; Category:   Drug ; Substance:   adhesive tape ; Type:   Allergy ; Updated By:   Brittany Rey; Reviewed Date:   5/15/2017 2:54 PM CDT      Cipro  Estimated Onset Date:   Unspecified ; Reactions:   joint pain ; Created By:   Brittany Singh MA; Reaction Status:   Active ; Category:   Drug ; Substance:   Cipro ; Type:   Allergy ; Updated By:   Brittany Singh MA; Source:   Patient ; Reviewed Date:   5/15/2017 2:54 PM CDT      doxycycline  Estimated  Onset Date:   Unspecified ; Reactions:   systemic rxn ; Created By:   Brittany Singh MA; Reaction Status:   Active ; Category:   Drug ; Substance:   doxycycline ; Type:   Allergy ; Updated By:   Brittany Singh MA; Source:   Patient ; Reviewed Date:   5/15/2017 2:54 PM CDT      Dust  Estimated Onset Date:   Unspecified ; Created By:   Brittany Rey; Reaction Status:   Active ; Category:   Environment ; Substance:   Dust ; Type:   Allergy ; Updated By:   Brittany Rey; Reviewed Date:   5/15/2017 2:54 PM CDT      Mold  Estimated Onset Date:   Unspecified ; Created By:   Brittany Rey; Reaction Status:   Active ; Category:   Environment ; Substance:   Mold ; Type:   Allergy ; Updated By:   Brittany Rey; Reviewed Date:   5/15/2017 2:54 PM CDT      Pollen  Estimated Onset Date:   Unspecified ; Created By:   Brittany Rey; Reaction Status:   Active ; Category:   Environment ; Substance:   Pollen ; Type:   Allergy ; Updated By:   Brittany Rey; Reviewed Date:   5/15/2017 2:54 PM CDT      Strawberries  Estimated Onset Date:   Unspecified ; Created By:   Brittany Rey; Reaction Status:   Active ; Category:   Food ; Substance:   Strawberries ; Type:   Allergy ; Updated By:   Brittany Rey; Reviewed Date:   5/15/2017 2:54 PM CDT      sulfa drug  Estimated Onset Date:   Unspecified ; Reactions:   Rash, Difficulty breathing ; Created By:   Brittany Rey; Reaction Status:   Active ; Category:   Drug ; Substance:   sulfa drug ; Type:   Allergy ; Updated By:   Brittany Rey; Source:   Paper Chart ; Reviewed Date:   5/15/2017 2:54 PM CDT        Medication List   (As Of: 2/19/2019 2:29:56 PM CST)   Prescription/Discharge Order    albuterol  :   albuterol ; Status:   Prescribed ; Ordered As Mnemonic:   ProAir HFA 90 mcg/inh inhalation aerosol ; Simple Display Line:   2 puff(s), Inhale, q4 hrs, PRN: as needed for wheezing, 1 EA, 11 Refill(s) ; Ordering Provider:   Josh Silverman MD; Catalog Code:   albuterol ; Order  Dt/Tm:   10/11/2018 12:19:25 PM          cephalexin  :   cephalexin ; Status:   Completed ; Ordered As Mnemonic:   Keflex 500 mg oral capsule ; Simple Display Line:   500 mg, 1 cap(s), PO, QID, for 10 day(s), 40 cap(s), 0 Refill(s) ; Ordering Provider:   Keyana Rayo PA-C; Catalog Code:   cephalexin ; Order Dt/Tm:   10/19/2018 2:41:51 PM          fluticasone-salmeterol  :   fluticasone-salmeterol ; Status:   Prescribed ; Ordered As Mnemonic:   Advair Diskus 100 mcg-50 mcg inhalation powder ; Simple Display Line:   1 puff(s), inh, bid, 1 EA, 11 Refill(s) ; Ordering Provider:   Josh Silverman MD; Catalog Code:   fluticasone-salmeterol ; Order Dt/Tm:   10/11/2018 12:19:26 PM          fluticasone nasal  :   fluticasone nasal ; Status:   Prescribed ; Ordered As Mnemonic:   fluticasone 50 mcg/inh nasal spray ; Simple Display Line:   2 spray(s), nasal, daily, for 30 day(s), 1 EA, 11 Refill(s) ; Ordering Provider:   Josh Silverman MD; Catalog Code:   fluticasone nasal ; Order Dt/Tm:   10/11/2018 12:19:23 PM          Miscellaneous Rx Supply  :   Miscellaneous Rx Supply ; Status:   Prescribed ; Ordered As Mnemonic:   compression stockings ; Simple Display Line:   See Instructions, 30-40mm Hg  459.81  chronic venous insuffiency  prevents leg pain and swelling, 2 EA ; Ordering Provider:   Josh Silverman MD; Catalog Code:   Miscellaneous Rx Supply ; Order Dt/Tm:   6/25/2012 2:41:39 PM          polymyxin B-trimethoprim ophthalmic  :   polymyxin B-trimethoprim ophthalmic ; Status:   Completed ; Ordered As Mnemonic:   Polytrim 10,000 units-1 mg/mL ophthalmic solution ; Simple Display Line:   1 drop(s), Eye-Right, q3hr, for 10 day(s), 10 mL, 0 Refill(s) ; Ordering Provider:   Keyana Rayo PA-C; Catalog Code:   polymyxin B-trimethoprim ophthalmic ; Order Dt/Tm:   10/19/2018 2:41:14 PM            Home Meds    multivitamin  :   multivitamin ; Status:   Documented ; Ordered As Mnemonic:   Multiple Vitamins  oral tablet ; Simple Display Line:   1 tab(s), PO, daily ; Catalog Code:   multivitamin ; Order Dt/Tm:   6/25/2012 2:16:21 PM          multivitamin with minerals  :   multivitamin with minerals ; Status:   Documented ; Ordered As Mnemonic:   PreserVision AREDS 2 ; Simple Display Line:   1 cap(s), po, daily, 0 Refill(s) ; Catalog Code:   multivitamin with minerals ; Order Dt/Tm:   7/14/2016 9:23:00 AM          omega-3 polyunsaturated fatty acids  :   omega-3 polyunsaturated fatty acids ; Status:   Documented ; Ordered As Mnemonic:   Fish Oil ; Simple Display Line:   po, 0 Refill(s) ; Catalog Code:   omega-3 polyunsaturated fatty acids ; Order Dt/Tm:   9/15/2015 3:07:24 PM          polyethylene glycol 3350  :   polyethylene glycol 3350 ; Status:   Documented ; Ordered As Mnemonic:   MiraLax ; Simple Display Line:   17 gm, po, daily ; Catalog Code:   polyethylene glycol 3350 ; Order Dt/Tm:   10/3/2011 11:54:08 AM            Social History   Social History   (As Of: 2/19/2019 2:29:56 PM CST)   Alcohol:        Current, Wine (5 oz), Daily, 1 drinks/episode average.  2 drinks/episode maximum.   (Last Updated: 10/11/2017 11:21:27 AM CDT by Brooke Constantino)          Tobacco:        Past   (Last Updated: 6/25/2012 4:51:04 PM CDT by Brittany Singh MA)          Substance Abuse:        Never   (Last Updated: 6/25/2012 4:51:08 PM CDT by Brittany Singh MA)          Employment and Education:        Retired   (Last Updated: 6/7/2013 12:24:33 PM CDT by Brittany Rey)          Home and Environment:        Marital status: .  Spouse/Partner name: Tomy.  Lives with Spouse.  3 children.   (Last Updated: 6/7/2013 12:36:12 PM CDT by Brittany Rey)          Nutrition and Health:        Type of diet: Regular.   (Last Updated: 9/17/2015 9:28:56 AM CDT by Marcelina Loredo CMA)          Exercise and Physical Activity:        Exercise frequency: 3-4 times/week.  Exercise type: Aerobics, Walking, Yoga, hiking.   (Last Updated: 10/10/2017  10:53:29 AM CDT by Keyona Carter)          Sexual:        Sexually active: No.   (Last Updated: 9/17/2015 9:28:37 AM CDT by Marcelina Loredo CMA)

## 2022-02-16 NOTE — PROGRESS NOTES
Chief Complaint    Pain in upper arm/hips since may. Pain consult per GTG.  History of Present Illness      And is a healthy 74-year-old who developed shoulder and hip girdle achiness and stiffness in early May.  It progressed and worsened over period of days.  Inflammatory markers were normal.  Symptoms resolved with prednisone.  In the 70s she was diagnosed with rheumatoid arthritis and had problems for about 4 years until it resolved with the pregnancy.  She does have some stiffness in her hands and a bit of morning stiffness.  No swollen joints.  Review of Systems      No eye complaints.  No fever, chills, weight loss, chest pain, dyspnea, edema, abdominal pain, diarrhea, dysuria, hematuria.  She had a febrile illness last October with cough and [place] treated herself with antibiotics, no other recent illnesses.  Physical Exam   Vitals & Measurements    T: 98.4   F (Tympanic)  HR: 70(Peripheral)  BP: 160/70  SpO2: 97%     HT: 66 in  WT: 148 lb  BMI: 23.89       Patient is a healthy-appearing woman in no distress.  Eyes appear normal.  HEENT exam is unremarkable.  Neck supple no nothing thyromegaly.  Chest clear to auscultation and percussion.  Heart exam regular no murmur or edema.  Abdomen soft and nontender.  Skin is free of rashes.  Examination of the joints she does have some difficulty abducting the shoulders.  Mild inflammatory changes in the fingers and hands, joints otherwise unremarkable.  Assessment/Plan       Arthralgia (M25.50)         Normal inflammatory markers but a history of rheumatoid arthritis in the past symptoms suggestive of inflammatory changes in the structures surrounding the big joints, perhaps mild synovitis in the hands and wrists.  Will resume prednisone at the lowest dose that will control her symptoms starting with 10 mg daily.  Repeat lab work including anti-CCP and rheumatoid factor as well as DAMIEN.  X-rays of the hands shoulders pelvis.         Ordered:           [id number] office outpatient new 30 minutes (Charge), Quantity: 1, Arthralgia          [id number] office outpatient new 45 minutes (Charge), Quantity: 1, Arthralgia  Osteoporosis          DAMIEN Screen, IFA, with Reflex to Titer and Pattern* ([facility]), Specimen Type: Serum, Collection Date: 06/18/20 11:45:00 CDT          CBC (includes diff/plt)* ([facility]), Specimen Type: Blood, Collection Date: 06/18/20 11:45:00 CDT          Comprehensive Metabolic Panel* ([facility]), Specimen Type: Serum, Collection Date: 06/18/20 11:45:00 CDT          Cyclic citrullinated peptide (ccp) ab (igg)* ([facility]), Specimen Type: Serum, Collection Date: 06/18/20 11:45:00 CDT          Lyme disease antibody, total, eia with reflex to western blot (igg,igm)* ([facility]), Specimen Type: Serum, Collection Date: 06/18/20 11:45:00 CDT          Rheumatoid factor* ([facility]), Specimen Type: Serum, Collection Date: 06/18/20 11:45:00 CDT          XR Hand 2 Views Left (Request), Arthralgia          XR Hand 2 Views Right (Request), Arthralgia          XR Pelvis 1 View (Request), Arthralgia          XR Shoulder Left (Request), Arthralgia          XR Shoulder Right (Request), Arthralgia                Osteoporosis (M81.0)         Given her diagnosis of osteoporosis and the need to use prednisone will start her on alendronate and recommended adequate calcium as well as supplemental vitamin D.         Ordered:          [id number] office outpatient new 45 minutes (Charge), Quantity: 1, Arthralgia  Osteoporosis                Orders:         alendronate, = 1 tab(s) ( 70 mg ), Oral, qweek, Instructions: with 6-8 oz plain water, at least 30 minutes before first food, beverage, or medication of the day, # 4 tab(s), 5 Refill(s), Type: Maintenance, Pharmacy: [facility], 1 tab(s) Oral qwe..., (Ordered)         predniSONE, = 2 tab(s) ( 10 mg ), Oral, daily, # 60 tab(s), 1 Refill(s), Type: Maintenance, Pharmacy: [facility], 2 tab(s) Oral daily, 66, in,  06/18/20 11:17:00 CDT, Height Measured, 148, lb, 06/18/20 11:17:00 CDT, Weight Measured, (Ordered)  Patient Information     Name:PETEY AZAR      Address:      [address], [place] [id number]     Sex:Female     YOB: 1945     [phone]     Emergency Contact:RELL AZAR     MRN:[id number]     FIN:[id number]     Location:[facility]     Date of Service:06/18/2020      Primary Care Physician:       Josh Silverman MD, [phone]      Attending Physician:       Jan Felix MD, [phone]  Problem List/Past Medical History    Ongoing     Asthma     Chronic venous insufficiency     Mild intermittent asthma     Osteoarthritis of CMC joint of thumb     Osteoporosis     Seasonal allergies    Historical     Chicken pox     Pregnancy     Pregnancy     Pregnancy     Tobacco abuse       Comments: quit  Procedure/Surgical History     Extracapsular cataract extraction and insertion of intraocular lens (10/05/2017)      Comments: Left..     Extracapsular cataract extraction and insertion of intraocular lens (09/21/2017)      Comments: Right..     Colonoscopy (10/21/2016)      Comments: Indication:   screening      Sedation:   Midazolam 3mg, Fentanyl 150mcg--IV      Findings:  no polyps/abnormalities seen      Recommendation:   f/u only as needed..     Cholecystectomy with cholangiography (06/11/2013)     Colonoscopy (02/08/2006)      Comments: diverticulosis.    rpt 02/2009. Procedure done in [place].  Repeat in 3 years with yearly hemoccults..     Hernia repair (07/01/1994)      Comments: Right femoral hernia.     Breast biopsy and related procedures      Comments: benign.     cyst removal     Knee replacement     Tonsillectomy     venous ligation      Comments: both legs..  Medications    Advair Diskus 100 mcg-50 mcg inhalation powder, 1 puff(s), Inhale, bid, 11 refills    alendronate 70 mg oral tablet, 70 mg= 1 tab(s), Oral, qweek, 5 refills    calcium (as carbonate) 600  mg oral tablet, 1200 mg= 2 tab(s), Oral, daily    compression stockings, See Instructions, 5 refills    Fish Oil, Oral    Flonase 50 mcg/inh nasal spray, 100 mcg= 2 spray(s), Nasal, daily, 11 refills    MiraLax, 17 gm, Oral, daily    Multiple Vitamins oral tablet, 1 tab(s), Oral, daily    predniSONE 5 mg oral tablet, 10 mg= 2 tab(s), Oral, daily, 1 refills    PreserVision AREDS 2, 1 cap(s), Oral, daily    ProAir HFA 90 mcg/inh inhalation aerosol, 2 puff(s), Inhale, q4 hrs, PRN, 11 refills  Allergies    Cipro (joint pain)    Dust    Mold    Pollen    Strawberries    adhesive tape (Rash)    doxycycline (systemic rxn)    sulfa drug (Difficulty breathing, Rash)  Social History    Smoking Status - 06/18/2020     Never smoker     Alcohol - Current, 11/06/2019      Wine (5 oz), Daily, 1 drinks/episode average. Ready to change: No., 11/06/2019     Employment/School      Retired, Highest education level: Master's., 11/06/2019     Exercise      Exercise frequency: 3-4 times/week. Exercise type: Aerobics, Yoga., 11/06/2019     Home/Environment      Marital status: . Spouse/Partner name: Tomy. Lives with Spouse. 3 children. Living situation: Home/Independent. Injuries/Abuse/Neglect in household: No. Feels unsafe at home: No. Family/Friends available for support: Yes., 11/06/2019     Nutrition/Health      Type of diet: Regular. Wants to lose weight: No. Sleeping concerns: No. Feels highly stressed: No., 11/06/2019     Sexual      Sexually active: No., 09/17/2015     Substance Abuse - Denies Substance Abuse, 11/06/2019      Never, 06/25/2012     Tobacco - Past, 11/06/2019      Quit in 1 982, Cigarettes, 4 per day. Total pack years: 15., 11/06/2019      Past, 06/25/2012  Family History    Alcoholism: Father.    Allergic rhinitis: Mother.    Arthritis: Mother and Father.    Breast cancer: Grandmother (P).    CA - Cancer of colon: Father.    Cancer: Negative: Daughter.    Crohns disease...: Daughter.    Dementia: Mother.     Kidney failure...: Father.    Sarcoidosis...: Mother.    Sister: History is unknown    Brother: History is unknown    Sister: History is unknown  Immunizations      Vaccine Date Status          influenza virus vaccine, inactivated 10/01/2019 Recorded          zoster vaccine, inactivated 06/14/2019 Recorded          zoster vaccine, inactivated 04/15/2019 Recorded          typhoid, inactivated 02/19/2019 Given          influenza virus vaccine, inactivated 10/11/2018 Given          influenza virus vaccine, inactivated 10/09/2017 Given          tetanus/diphth/pertuss (Tdap) adult/adol 10/09/2017 Recorded          influenza virus vaccine, inactivated 09/21/2016 Given          influenza virus vaccine, inactivated 10/19/2015 Given          influenza virus vaccine, inactivated 10/14/2014 Given          pneumococcal (PCV13) 10/14/2014 Given          typhoid, inactivated 02/10/2014 Given          influenza virus vaccine, inactivated 10/22/2013 Recorded          pneumococcal (PPSV23) 10/03/2011 Given          influenza virus vaccine, inactivated 10/03/2011 Given          influenza virus vaccine, inactivated 09/20/2010 Given          influenza, H1N1, inactivated 11/03/2009 Recorded          typhoid, inactivated 04/27/2009 Recorded          ZOS, shingles 04/27/2009 Recorded          tetanus/diphth/pertuss (Tdap) adult/adol 04/21/2008 Recorded          Hep A 07/01/1999 Recorded          typhoid, inactivated 11/01/1998 Recorded          Hep A 11/01/1998 Recorded          yellow fever 11/01/1998 Recorded  Lab Results          Lab Results (Last 4 results within 90 days)           Sodium Level: 138 mmol/L [135 mmol/L - 146 mmol/L] (05/05/20 15:51:00)          Potassium Level: 4 mmol/L [3.5 mmol/L - 5.3 mmol/L] (05/05/20 15:51:00)          Chloride Level: 103 mmol/L [98 mmol/L - 110 mmol/L] (05/05/20 15:51:00)          CO2 Level: 29 mmol/L [20 mmol/L - 32 mmol/L] (05/05/20 15:51:00)          Glucose Level: 102 mg/dL High [65 mg/dL -  99 mg/dL] (05/05/20 15:51:00)          BUN: 20 mg/dL [7 mg/dL - 25 mg/dL] (05/05/20 15:51:00)          Creatinine Level: 0.87 mg/dL [0.6 mg/dL - 0.93 mg/dL] (05/05/20 15:51:00)          BUN/Creat Ratio: NOT APPLICABLE [6  - 22] (05/05/20 15:51:00)          eGFR: 66 mL/min/1.73m2 (05/05/20 15:51:00)          eGFR African American: 76 mL/min/1.73m2 (05/05/20 15:51:00)          Calcium Level: 9.8 mg/dL [8.6 mg/dL - 10.4 mg/dL] (05/05/20 15:51:00)          C-Reactive Protein (CRP): 7.3 mg/L (05/05/20 15:51:00)          Total CK: 64 unit/L [29 unit/L - 143 unit/L] (05/05/20 15:51:00)          TSH: 2.36 mIU/L [0.4 mIU/L - 4.5 mIU/L] (05/05/20 15:51:00)          WBC: 5.8 [3.8  - 10.8] (05/05/20 15:51:00)          RBC: 4.21 [3.8  - 5.1] (05/05/20 15:51:00)          Hgb: 13 gm/dL [11.7 gm/dL - 15.5 gm/dL] (05/05/20 15:51:00)          Hct: 37.2 % [35 % - 45 %] (05/05/20 15:51:00)          MCV: 88.4 fL [80 fL - 100 fL] (05/05/20 15:51:00)          MCH: 30.9 pg [27 pg - 33 pg] (05/05/20 15:51:00)          MCHC: 34.9 gm/dL [32 gm/dL - 36 gm/dL] (05/05/20 15:51:00)          RDW: 12.5 % [11 % - 15 %] (05/05/20 15:51:00)          Platelet: 298 [140  - 400] (05/05/20 15:51:00)          MPV: 10.1 fL [7.5 fL - 12.5 fL] (05/05/20 15:51:00)          Sed Rate: 9 (05/28/20 14:41:00)          Sed Rate: 14 (05/05/20 15:51:00)

## 2022-02-16 NOTE — PROGRESS NOTES
Patient:   PETEY AZAR            MRN: [id number]            FIN: [id number]               Age:   71 years     Sex:  Female     :  1945   Associated Diagnoses:   Preoperative clearance; Cataract   Author:   Josh Silverman MD      Chief Complaint   2017 3:16 PM CDT    preop--surgery 17 for right eye cataract and 10/5/17 for left eye cataract w/ Dr. Cassidy at [facility].      Preoperative Information   Indication for surgery:  Eye disorder, symptomatic cataracts.    Accompanied by:  No one.    Source of history:  Self, Medical record.           Review of Systems   Constitutional:  No fever, No chills, No sweats, No weakness, No fatigue.    Eye:  No recent visual problem.    Ear/Nose/Mouth/Throat:  No decreased hearing, No nasal congestion, No sore throat.    Respiratory:  No shortness of breath, No cough.    Cardiovascular:  Negative, No chest pain, No palpitations, No peripheral edema.    Gastrointestinal:  No nausea, No vomiting, No diarrhea, No constipation, No heartburn.    Genitourinary:  No dysuria, No change in urine stream.    Hematology/Lymphatics:  No bruising tendency, No bleeding tendency.    Endocrine:  No cold intolerance, No heat intolerance.    Immunologic:  Negative.    Musculoskeletal:  No back pain, No neck pain, No joint pain, No muscle pain.    Integumentary:  No rash, No dryness, No skin lesion.    Neurologic:  Alert and oriented X4, No headache.    Psychiatric:  No anxiety, No depression.       Health Status   Allergies:    Allergic Reactions (Selected)  Severity Not Documented  Adhesive tape (Rash)  Cipro (Joint pain)  Doxycycline (Systemic rxn)  Dust (No reactions were documented)  Mold (No reactions were documented)  Pollen (No reactions were documented)  Strawberries (No reactions were documented)  Sulfa drug (Rash and difficulty breathing)   Medications:  (Selected)   Prescriptions  Prescribed  Advair Diskus 100 mcg-50 mcg inhalation powder: See Instructions, Instructions:  INHALE 1 PUFF ORALLY TWICE DAILY, # 60 unknown unit, Type: Soft Stop, Pharmacy: [facility], INHALE 1 PUFF ORALLY TWICE DAILY  ProAir HFA 90 mcg/inh inhalation aerosol: See Instructions, Instructions: INHALE 2 PUFFS BY MOUTH EVERY 4 HOURS, # 8.5 unknown unit, Type: Soft Stop, Pharmacy: [facility], INHALE 2 PUFFS BY MOUTH EVERY 4 HOURS  compression stockings: compression stockings, See Instructions, Instructions: 30-40mm Hg  459.81  chronic venous insuffiency  prevents leg pain and swelling, # 2 EA, 5 Refill(s), Type: Soft Stop  fluticasone 50 mcg/inh nasal spray: 2 spray(s), nasal, daily, # 1 EA, 11 Refill(s), Pharmacy: [facility], 2 spray(s) nasal daily  Documented Medications  Documented  Fish Oil: po, 0 Refill(s), Type: Maintenance  MiraLax: ( 17 gm ), po, daily, 0 Refill(s), Type: Maintenance  Multiple Vitamins oral tablet: 1 tab(s), PO, daily, 0 Refill(s), Type: Soft Stop  PreserVision AREDS 2: 1 cap(s), po, daily, 0 Refill(s), Type: Maintenance   Problem list:    All Problems  Chronic Venous Insufficiency / ICD-9-.81 / Confirmed  Asthma / SNOMED CT 1458224443 / Confirmed  Mild intermittent asthma / SNOMED CT 6874795161 / Confirmed  Osteopenia of the Elderly / ICD-9-.90 / Confirmed  Osteoarthritis of CMC joint of thumb / SNOMED CT 871611640 / Confirmed  Seasonal Allergies / ICD-9-.9 / Confirmed  Resolved: Pregnancy / SNOMED CT 605502046  Resolved: Pregnancy / SNOMED CT 745580267  Resolved: Pregnancy / SNOMED CT 619553049  Resolved: Chicken Pox / ICD-9-.9  Resolved: Tobacco abuse / SNOMED CT 914182234      Histories   Past Medical History:    Active  Chronic Venous Insufficiency (459.81): Onset on 6/25/2012 at 66 years.  Asthma (2131805212)  Osteopenia of the Elderly (733.90)  Seasonal Allergies (477.9)  Resolved  Tobacco abuse (819850733):  Resolved.  Comments:  5/3/2010 CDT 9:31 AM CDT - Mira Marion CMA  quit  Pregnancy  (871196173):  Resolved in  at 26 years.  Pregnancy (270306974):  Resolved in  at 28 years.  Pregnancy (498367303):  Resolved in  at 34 years.  Chicken Pox (052.9):  Resolved.   Family History:    Dementia  Mother ()  Kidney failure...  Father ()  Sarcoidosis...  Mother ()  Crohns disease...  Daughter (Milady)     Procedure history:    Colonoscopy (SNOMED CT 454392740) on 10/21/2016 at 70 Years.  Comments:  10/24/2016 2:22 PM - Brittany Singh MA  Indication:   screening  Sedation:   Midazolam 3mg, Fentanyl 150mcg--IV  Findings:  no polyps/abnormalities seen  Recommendation:   f/u only as needed.  Cholecystectomy with cholangiography (SNOMED CT 574512759) on 2013 at 67 Years.  Colonoscopy (SNOMED CT 408879567) on 2006 at 60 Years.  Comments:  2013 3:31 PM - Talia Llanes CMA  Procedure done in [place].  Repeat in 3 years with yearly hemoccults.    2012 9:28 AM - Brittany Singh MA  diverticulosis.    rpt 2009  Hernia repair (SNOMED CT 56239580) on 1994 at 48 Years.  Comments:  2013 12:42 PM - Brittany Rey  Right femoral hernia  Tonsillectomy (SNOMED CT 823988086).  Breast biopsy and related procedures (SNOMED CT 019248419).  Comments:  5/3/2010 9:28 AM - Mira Wu  benign  cyst removal.  Knee replacement (SNOMED CT 401649713).  venous ligation.  Comments:  2012 4:52 PM - Brittany Singh MA  both legs.   Social History:        Alcohol Assessment            Current, Wine (5 oz), Daily, 1 drinks/episode average.      Tobacco Assessment            Past      Substance Abuse Assessment            Never      Employment and Education Assessment            Retired      Home and Environment Assessment            Marital status: .  Spouse/Partner name: Tomy.  Lives with Spouse.  3 children.      Nutrition and Health Assessment            Type of diet: Regular.      Exercise and Physical Activity Assessment: Regular exercise             Exercise frequency: 3-4 times/week.  Exercise type: Aerobics, Yoga.      Sexual Assessment            Sexually active: No.       Has ano history of anemia.  Has no history of DVT or pulmonary embolism.  Has no personal history of bleeding problems.   Has no personal or family history of anesthesia reactions.  Patient does not have active tuberculosis.    S/he has not taken aspirin or aspirin containing products in the last week.     S/he has not taken Plavix (Clopidogrel) in the last 2 weeks.    S/he has not taken warfarin in the past week.    S/he has not been on corticosteroids for more than 2 weeks recently.      S/he is not DNR before, during or after surgery.    Chest pain / SOB walking up 2 flights of steps:  no  Pain in neck or jaw:  no  CAD MI:  no  Afib:  no  Heart Failure:  no  Asthma  or Bronchitis:  yes  Diabetes:  no  Seizure Disorder:  no  CKD:  no  Thyroid Disease:  no  Liver Disease:  no  CVA:  no         Physical Examination   Vital Signs   9/19/2017 3:16 PM CDT Temperature Tympanic 98.7 DegF    Peripheral Pulse Rate 76 bpm    Pulse Site Radial artery    HR Method Manual    Systolic Blood Pressure 100 mmHg    Diastolic Blood Pressure 66 mmHg    Mean Arterial Pressure 77 mmHg    BP Site Right arm    BP Method Manual      Measurements from flowsheet : Measurements   9/19/2017 3:16 PM CDT Height Measured - Standard 65.5 in    Weight Measured - Standard 148.8 lb    BSA 1.76 m2    Body Mass Index 24.38 kg/m2      General:  Alert and oriented, No acute distress.    Eye:  Pupils are equal, round and reactive to light, Extraocular movements are intact, Normal conjunctiva.    HENT:  Normocephalic, Tympanic membranes are clear, Oral mucosa is moist, No pharyngeal erythema, No sinus tenderness.    Neck:  Supple, Non-tender, No carotid bruit, No lymphadenopathy, No thyromegaly.    Respiratory:  Lungs are clear to auscultation, Respirations are non-labored, Breath sounds are equal, No chest wall tenderness.     Cardiovascular:  Normal rate, Regular rhythm, No murmur, No gallop, Normal peripheral perfusion, No edema.    Gastrointestinal:  Soft, Non-tender, No organomegaly.    Musculoskeletal:  Normal range of motion, Normal strength, No swelling, No deformity.    Integumentary:  Warm, Dry, No rash.    Neurologic:  Alert, Oriented, No focal deficits.    Psychiatric:  Cooperative, Appropriate mood & affect.       Review / Management         Results review:  Lab results   9/19/2017 3:59 PM CDT Sodium Level 141 mmol/L    Potassium Level 4.5 mmol/L    Chloride Level 102 mmol/L    CO2 Level 30 mmol/L    Glucose Level 107 mg/dL  HI    BUN 24 mg/dL    Creatinine Level 0.91 mg/dL    eGFR 63 mL/min/1.73m2    eGFR African American 74 mL/min/1.73m2    Calcium Level 10.2 mg/dL   .    ECG interpretation:  Date:  4/12/2017.     Indication: pre-operative exam.     EKG findings   Rhythm: heart rate  60  beats/min, sinus normal.     Axis: normal axis, normal configuration.     Intervals: normal.     P waves: normal.     QRS complex: normal.     ST-T-U complex: normal.     Interpretation: normal EKG.    .       Impression and Plan   Diagnosis     Preoperative clearance (NRC29-KX Z01.818).     Cataract (PNM64-ZJ H25.13).     Condition:  Stable, The patient is cleared for sedation and the procedure..

## 2022-02-16 NOTE — PROGRESS NOTES
Patient:   PETEY AZAR            MRN: [id number]            FIN: [id number]               Age:   71 years     Sex:  Female     :  1945   Associated Diagnoses:   Left groin mass   Author:   Josh Silverman MD      Visit Information      Date of Service: 2017 01:06 pm  Performing Location: [facility]  Encounter#: [id number]      Primary Care Provider (PCP):  Josh Silverman MD    NPI# [id number]      Chief Complaint   2017 1:22 PM CDT    Patient presents today to address a new buldge that is located where her left inguinal hernia repair was done x 8 days ago on 2017. There is mild pain. Spoke with nurse w/ Dr. Larios who advised patient to address this with PCP.        History of Present Illness   Patient presents today to address a new bulge that is located where her left inguinal hernia repair was done x 8 days ago on 2017. There is mild pain. Spoke with nurse w/ Dr. Larios who advised patient to address this with PCP.  She had a pressure bandage placed after the surgery and did not notice the bulge right after her surgery. She noticed the buldge was a couple days ago when she was checking her incision site. When she lays down the bulge does not go away. With her left inguinal hernia before surgery the hernia did not go away when she laid down. She has not had a f/u with Dr. Larios yet. A drain was placed after surgery..         Review of Systems   Constitutional:  No fever, No chills, No decreased activity.    Ear/Nose/Mouth/Throat:  sinus congestion and pressure.    Gastrointestinal:  No abdominal pain.    Genitourinary:  Negative, No dysuria, No hematuria.    Immunologic:  Negative.    Musculoskeletal:  No back pain, No trauma.    Integumentary:  hematoma located at incision site where left inguinal hernia was repaired.    Neurologic:  Alert and oriented X4, No numbness, No tingling, No headache.    Psychiatric:  No anxiety, No depression.              Health Status   Allergies:    Allergic Reactions (Selected)  Severity Not Documented  Adhesive tape (Rash)  Cipro (Joint pain)  Doxycycline (Systemic rxn)  Dust (No reactions were documented)  Mold (No reactions were documented)  Pollen (No reactions were documented)  Strawberries (No reactions were documented)  Sulfa drug (Rash and difficulty breathing)   Medications:  (Selected)   Prescriptions  Prescribed  Advair Diskus 100 mcg-50 mcg inhalation powder: 1 puff(s), inh, bid, # 60 EA, 11 Refill(s), Type: Maintenance, Pharmacy: [facility], 1 puff(s) inh bid  albuterol CFC free 90 mcg/inh inhalation aerosol: 2 puff(s), inh, q4 hrs, # 1 EA, 11 Refill(s), Type: Maintenance, Pharmacy: [facility], 2 puff(s) inh q4 hrs  compression stockings: compression stockings, See Instructions, Instructions: 30-40mm Hg  459.81  chronic venous insuffiency  prevents leg pain and swelling, # 2 EA, 5 Refill(s), Type: Soft Stop  fluticasone 50 mcg/inh nasal spray: 2 spray(s), nasal, daily, # 1 EA, 11 Refill(s), Pharmacy: [facility], 2 spray(s) nasal daily  Documented Medications  Documented  Fish Oil: po, 0 Refill(s), Type: Maintenance  MiraLax: ( 17 gm ), po, daily, 0 Refill(s), Type: Maintenance  Multiple Vitamins oral tablet: 1 tab(s), PO, daily, 0 Refill(s), Type: Soft Stop  PreserVision AREDS 2: 1 cap(s), po, daily, 0 Refill(s), Type: Maintenance   Problem list:    All Problems (Selected)  Chronic Venous Insufficiency / ICD-9-.81 / Confirmed  Asthma / SNOMED CT 6334151284 / Confirmed  Mild intermittent asthma / SNOMED CT 6239558880 / Confirmed  Osteopenia of the Elderly / ICD-9-.90 / Confirmed  Osteoarthritis of CMC joint of thumb / SNOMED CT 573218374 / Confirmed  Seasonal Allergies / ICD-9-.9 / Confirmed      Histories   Past Medical History:    Active  Chronic Venous Insufficiency (459.81): Onset on 6/25/2012 at 66 years.  Asthma (5485946929)  Osteopenia  of the Elderly (733.90)  Seasonal Allergies (477.9)  Resolved  Tobacco abuse (379290798):  Resolved.  Comments:  5/3/2010 CDT 9:31 AM CDT - Mira Marion CMA  quit  Pregnancy (600115669):  Resolved in  at 26 years.  Pregnancy (920034059):  Resolved in  at 28 years.  Pregnancy (291991189):  Resolved in  at 34 years.  Chicken Pox (052.9):  Resolved.   Family History:    Dementia  Mother ()  Kidney failure...  Father ()  Sarcoidosis...  Mother ()  Crohns disease...  Daughter (Milady)     Procedure history:    Colonoscopy (246713771) on 10/21/2016 at 70 Years.  Comments:  10/24/2016 2:22 PM - Brittany Singh MA  Indication:   screening  Sedation:   Midazolam 3mg, Fentanyl 150mcg--IV  Findings:  no polyps/abnormalities seen  Recommendation:   f/u only as needed.  Cholecystectomy with cholangiography (482256978) on 2013 at 67 Years.  Colonoscopy (438117837) on 2006 at 60 Years.  Comments:  2013 3:31 PM - Talia Llanes CMA  Procedure done in [place].  Repeat in 3 years with yearly hemoccults.    2012 9:28 AM - Brittany Singh MA  diverticulosis.    rpt 2009  Hernia repair (84558475) on 1994 at 48 Years.  Comments:  2013 12:42 PM - Brittany Rey  Right femoral hernia  Tonsillectomy (425959071).  Breast biopsy and related procedures (469612259).  Comments:  5/3/2010 9:28 AM - Mira Wu  benign  cyst removal.  Knee replacement (609245652).  venous ligation.  Comments:  2012 4:52 PM - Brittnay Singh MA  both legs.   Social History:        Alcohol Assessment            Current, Wine (5 oz), Daily, 1 drinks/episode average.      Tobacco Assessment            Past      Substance Abuse Assessment            Never      Employment and Education Assessment            Retired      Home and Environment Assessment            Marital status: .  Spouse/Partner name: Tomy.  Lives with Spouse.  3 children.      Nutrition and Health Assessment             Type of diet: Regular.      Exercise and Physical Activity Assessment: Regular exercise            Exercise frequency: 3-4 times/week.  Exercise type: Aerobics, Yoga.      Sexual Assessment            Sexually active: No.        Physical Examination   Vital Signs   4/26/2017 1:22 PM CDT Temperature Tympanic 98.7 DegF    Peripheral Pulse Rate 73 bpm    HR Method Electronic    Respiratory Rate 16 br/min    Systolic Blood Pressure 138 mmHg    Diastolic Blood Pressure 78 mmHg    Mean Arterial Pressure 98 mmHg    BP Site Left arm    BP Method Manual    Oxygen Saturation 98 %    Vital Signs Comments C/O sinues pressure and took Sudafed around 1230.      Measurements from flowsheet : Measurements   4/26/2017 1:22 PM CDT Height Measured - Standard 65.2 in    Weight Measured - Standard 145 lb    BSA 1.74 m2    Body Mass Index 23.98 kg/m2      General:  Alert and oriented, No acute distress.    Eye:  Normal conjunctiva.    HENT:  Normal hearing.    Respiratory:  Lungs are clear to auscultation, Respirations are non-labored.    Cardiovascular:  Normal rate, Regular rhythm.    Musculoskeletal:  firm non tender mass at site of previous hernia, left groin.    Integumentary:  Warm, Dry, No rash.    Neurologic:  Alert, Oriented.    Psychiatric:  Cooperative, Appropriate mood & affect.       Impression and Plan   Diagnosis     Left groin mass (QOA86-ZE R19.09).     Course:  Unchanged.    Plan:  Left inguinal hernia repair site examined. The bulge is 1/2 the size as her hernia before surgery.    There is firmness from the mesh and sutures placed during the surgery.    Patient may be more sensitive and prone to local reactions to sutures.    Inflammatory local reaction with infection or foreign body reaction discussed and Dx.    Patient informed that the firmness will soften with time.     Advised to be watchful and monitor symptoms. The main concern is redness near and around the incision site or if pain presents. Follow up  with GTG if those symptoms presents    No need for another surgery. There is more symmetry seen than before the surgery.    Advised to see Dr. Larios for a post op follow up next week.    Patient Instructions:       Counseled: Patient, Regarding diagnosis, Regarding treatment, Activity, Verbalized understanding.

## 2022-02-16 NOTE — TELEPHONE ENCOUNTER
---------------------  From: Brittany Singh MA   To: PETEY AZAR    Sent: 3/25/2020 2:53:29 PM CDT  Subject: Asthma Control     Good Afternoon, Petey,    This is Brittany BANKS CMA from [facility].  Patients with Asthma are at a higher risk for RESPIRATORY concerns, so we are reaching out to you to see if you have a few minutes to complete your ACT over the phone, or through our portal, to check to see how well your Asthma is controlled.  You can find the ACT form when you go to Your Visit on the portal, scroll to patient forms and click on Asthma Control Test (12 and over) and send the results via portal and we will put the score in your chart.  If we find any concerns, we can offer you a telephone visit.    Sincerely,      Brittany BANKS CMA/Dr. Silverman

## 2022-02-16 NOTE — PROGRESS NOTES
Patient:   PETEY AZAR            MRN: [id number]            FIN: [id number]               Age:   74 years     Sex:  Female     :  1945   Associated Diagnoses:   Medicare annual wellness visit, subsequent; Mild intermittent asthma; Osteoporosis   Author:   Josh Silverman MD      Visit Information      Date of Service: 2020 06:49 am  Performing Location: [facility]  Encounter#: [id number]      Primary Care Provider (PCP):  Josh Silverman MD    NPI# [id number]   Visit type:  Medicare, annual wellness visit.    Accompanied by:  No one.    Source of history:  Self.    History limitation:  None.       Chief Complaint      Well Adult History   Well Adult History             The patient presents for Medicare well exam.  The patient's general health status is described as good.  The patient's diet is described as balanced.  Exercise: routine, walking.  Associated symptoms consist of none.  Last menstrual period: menopausal.  Medical encounters: none.  Complaint:.  Additional pertinent history: caffeine use, tobacco use and alcohol use.       mammogram:  up to date   Pap smear:  n/a  colonoscopy:  n/a  lipid and diabetes screening:  up to date   immunizations:  up to date     Currently seeing rheumatology for PMR/RA symptoms controlled with prednisone.  Started alendronate for osteoporosis when started prednisone.    Overall has been doing well.  Asthma is under control and she is compliant with inhalers      Review of Systems   Constitutional:  No fever, No chills, No sweats, No weakness, No fatigue.    Eye:  No recent visual problem.    Ear/Nose/Mouth/Throat:  No decreased hearing, No nasal congestion, No sore throat.    Respiratory:  No shortness of breath, No cough.    Cardiovascular:  Negative, No chest pain, No palpitations, No peripheral edema.    Gastrointestinal:  No nausea, No vomiting, No diarrhea, No constipation, No heartburn.    Genitourinary:  No dysuria, No change in urine  stream.    Hematology/Lymphatics:  No bruising tendency, No bleeding tendency.    Endocrine:  No cold intolerance, No heat intolerance.    Immunologic:  Negative.    Musculoskeletal:  No back pain, No neck pain, No joint pain, No muscle pain.    Integumentary:  Rash, No dryness, No skin lesion.    Neurologic:  Alert and oriented X4,   Cognitive impairment:  no   Year: ok  Date: ok  City: ok.    Psychiatric:  No anxiety, No depression.    GDS and CAGE reviewed and discussed with patient.      Hearing impairment:  no  ADL: independent  Fall risk:  low  Home safety:  no concerns    Advanced care planning:  completed      Health Status   Allergies:    Allergic Reactions (Selected)  Severity Not Documented  Adhesive tape (Rash)  Cipro (Joint pain)  Doxycycline (Systemic rxn)  Dust (No reactions were documented)  Mold (No reactions were documented)  Pollen (No reactions were documented)  Strawberries (No reactions were documented)  Sulfa drug (Rash and difficulty breathing)   Medications:  (Selected)   Prescriptions  Prescribed  Advair Diskus 100 mcg-50 mcg inhalation powder: See Instructions, Instructions: INHALE 1  PUFF TWICE A DAY, # 1 EA, 0 Refill(s), Type: Maintenance, [facility], INHALE 1  PUFF TWICE A DAY, 66, in, 06/18/20 11:17:00 CDT, Height Measured, 148, lb, 06/18/20 11:17:00 CDT, We...  ProAir HFA 90 mcg/inh inhalation aerosol: 2 puff(s), Inhale, q4 hrs, PRN: as needed for wheezing, # 1 EA, 11 Refill(s), Type: Soft Stop, [facility] [place], 2 puff(s) Inhale q4 hrs,PRN:as needed for wheezing  alendronate 70 mg oral tablet: = 1 tab(s) ( 70 mg ), Oral, qweek, Instructions: with 6-8 oz plain water, at least 30 minutes before first food, beverage, or medication of the day, # 4 tab(s), 5 Refill(s), Type: Maintenance, [facility] [place], 1 tab(s) Oral qwe...  compression stockings: compression stockings, See Instructions, Instructions: 30-40mm Hg   459.81  chronic venous insuffiency  prevents leg pain and swelling, # 2 EA, 5 Refill(s), Type: Soft Stop  fluticasone 50 mcg/inh nasal spray: = 2 spray(s), Nasal, daily, Instructions: **DUE FOR APPT FOR FURTHER REFILLS**, # 1 EA, 0 Refill(s), Type: Maintenance, Pharmacy: [facility], **DUE FOR APPT FOR FURTHER REFILLS**, 2 spray(s) Nasal daily,Instr:**DUE FOR APPT FOR FURT...  predniSONE 5 mg oral tablet: = 2 tab(s) ( 10 mg ), Oral, daily, # 60 tab(s), 3 Refill(s), Type: Maintenance, Pharmacy: [facility], 2 tab(s) Oral daily, 66, in, 06/18/20 11:17:00 CDT, Height Measured, Weight Measured  Documented Medications  Documented  Fish Oil: po, 0 Refill(s), Type: Maintenance  MiraLax: ( 17 gm ), po, daily, 0 Refill(s), Type: Maintenance  Multiple Vitamins oral tablet: 1 tab(s), PO, daily, 0 Refill(s), Type: Soft Stop  PreserVision AREDS 2: 1 cap(s), po, daily, 0 Refill(s), Type: Maintenance  calcium (as carbonate) 600 mg oral tablet: = 2 tab(s) ( 1,200 mg ), Oral, daily, 0 Refill(s), Type: Maintenance   Problem list:    All Problems  Osteoporosis / SNOMED CT 280771797 / Confirmed  Asthma / SNOMED CT 6451058729 / Confirmed  Mild intermittent asthma / SNOMED CT 8651709767 / Confirmed  Osteoarthritis of CMC joint of thumb / SNOMED CT 309461378 / Confirmed  Chronic venous insufficiency / SNOMED CT 38473381 / Confirmed  Seasonal allergies / SNOMED CT 336601064 / Confirmed  Resolved: Tobacco abuse / SNOMED CT 779250834  Resolved: Pregnancy / SNOMED CT 788238157  Resolved: Pregnancy / SNOMED CT 219738790  Resolved: Pregnancy / SNOMED CT 733134115  Resolved: Chicken pox / SNOMED CT 82529539     Other Healthcare:         Histories   Past Medical History:    Active  Chronic venous insufficiency (95813821): Onset on 6/25/2012 at 66 years.  Asthma (4635476633)  Osteoporosis (547974431)  Seasonal allergies (602246394)  Osteoarthritis of CMC joint of thumb (943023013)  Mild intermittent asthma  (6445934609)  Resolved  Tobacco abuse (255755950):  Resolved.  Comments:  5/3/2010 CDT 9:31 AM CDT - Doni VILLASENOR Mira  quit  Pregnancy (656212920):  Resolved in  at 26 years.  Pregnancy (963025716):  Resolved in  at 28 years.  Pregnancy (020480699):  Resolved in  at 34 years.  Chicken pox (16168870):  Resolved.   Family History:    Dementia  Mother ()  Breast cancer  Grandmother (P)  Allergic rhinitis  Mother ()  CA - Cancer of colon  Father ()  Arthritis  Mother ()  Father ()  Alcoholism  Father ()  Kidney failure...  Father ()  Sarcoidosis...  Mother ()  Crohns disease...  Daughter (Milady)     Procedure history:    Extracapsular cataract extraction and insertion of intraocular lens (SNOMED CT 470043555) on 10/5/2017 at 71 Years.  Comments:  2018 2:13 PM CDT - Char Sharma  Left.  Extracapsular cataract extraction and insertion of intraocular lens (SNOMED CT 486092163) on 2017 at 71 Years.  Comments:  10/6/2017 2:55 PM CDT - Char Sharma  Right.  Colonoscopy (SNOMED CT 560748233) on 10/21/2016 at 70 Years.  Comments:  10/24/2016 2:22 PM CDT - Brittany Singh MA  Indication:   screening  Sedation:   Midazolam 3mg, Fentanyl 150mcg--IV  Findings:  no polyps/abnormalities seen  Recommendation:   f/u only as needed.  Cholecystectomy with cholangiography (SNOMED CT 251968451) on 2013 at 67 Years.  Colonoscopy (SNOMED CT 590693089) on 2006 at 60 Years.  Comments:  2013 3:31 PM CST - Talia Llanes CMA  Procedure done in [place].  Repeat in 3 years with yearly hemoccults.    2012 9:28 AM CDT - Brittany Singh MA  diverticulosis.    rpt 2009  Hernia repair (SNOMED CT 27348302) on 1994 at 48 Years.  Comments:  2013 12:42 PM CDT - Brittany Rey  Right femoral hernia  Tonsillectomy (SNOMED CT 537419140).  Breast biopsy and related procedures ([facility] 747436590).  Comments:  5/3/2010 9:28 AM CDT -  Mira Wu  benign  cyst removal.  Knee replacement (SNOMED CT 695806905).  venous ligation.  Comments:  6/25/2012 4:52 PM CDT - Francisco GOLDBERG, Brittany  both legs.   Social History:        Alcohol Assessment: Current            Wine (5 oz), Daily, 1 drinks/episode average.  Ready to change: No.      Tobacco Assessment: Past            Past            Quit in 1 982, Cigarettes, 4 per day.  Total pack years: 15.      Substance Abuse Assessment: Denies Substance Abuse            Never      Employment and Education Assessment            Retired, Highest education level: Master's.      Home and Environment Assessment            Marital status: .  Spouse/Partner name: Tomy.  Lives with Spouse.  3 children.  Living situation:               Home/Independent.  Injuries/Abuse/Neglect in household: No.  Feels unsafe at home: No.  Family/Friends               available for support: Yes.      Nutrition and Health Assessment            Type of diet: Regular.  Wants to lose weight: No.  Sleeping concerns: No.  Feels highly stressed: No.      Exercise and Physical Activity Assessment            Exercise frequency: 3-4 times/week.  Exercise type: Aerobics, Yoga.      Sexual Assessment            Sexually active: No.        Physical Examination   Measurements from flowsheet : Measurements   11/12/2020 11:03 AM CST  Height Measured - Standard                66 in     General:  Alert and oriented, No acute distress.       Review / Management   Results review      Impression and Plan   Diagnosis     Medicare annual wellness visit, subsequent (HHB16-QN Z09).     Course:  Progressing as expected.    Patient Instructions:       Counseled: Regarding diagnosis, Regarding medications, Smoking cessation, Verbalized understanding, Breast cancer, colon cancer, diabetes, lipid, HTN, obesity screening discussed and initiated, Risk factors for development of chronic disease and infirmities of ageing have been discussed and plans for  minimizing and screening for these conditions have been discussed.  Plans in place for management of conditions identified.  The preventative screening checklist has been completed and reviewed with the patient and a copy has been filled in the electronic record..    Diagnosis     Mild intermittent asthma (MEL32-AD J45.20).     Osteoporosis (BEH19-IE M81.0).     Course:  Progressing as expected, Well controlled.    Plan:  continue current medication.

## 2022-02-16 NOTE — PROGRESS NOTES
Chief Complaint    c/o stiff neck, upper arm pains(BL) x 7-10 days.  No known injury.  Has been using ibuprofen 400mg q4hrs with no relief.  Pain is constant - verbal consent given for telemed visit   Today's visit was conducted via telephone due to the COVID-19 pandemic.  Patient's consent to telephone visit was obtained and documented.   Call Start Time: 0806   Call End Time:   0817  History of Present Illness      Patient reports a 10-day history of fairly sudden onset of posterior neck pain and stiffness, bilateral arm pain and mild anterior thigh pain.  Denies any injury.  She has had no change in activity.  She denies fever, chills, sweats, cough, headache.  Her muscles are not sore to palpation.  There have been no changes in her medications.  She has not had this problem in the past.  Review of Systems      See HPI.  All other review of systems negative.  Assessment/Plan       1. Myalgia (M79.10)        Her symptom complex is very suspicious for polymyalgia rheumatica.  We will obtain an ESR, CRP, BMP, CBC, CK, and TSH for further evaluation.  We briefly discussed usual treatment for this problem.  Will notify patient when results are available.          Ordered:          [id number] physician telephone evaluation 11-20 min (Charge), Quantity: 1, Myalgia          Return to Clinic (Request), RFV: lab appt for ESR, CRP, BMP,CBC without diff, total CK, TSH           Patient Information     Name:PETEY AZAR      Address:      [address] [place] [id number]     Sex:Female     YOB: 1945     [phone]     Emergency Contact:RELL AZAR     MRN:[id number]     FIN:[id number]     Location:[facility]     Date of Service:05/05/2020      Primary Care Physician:       Josh Silverman MD, [phone]      Attending Physician:       Josh Silverman MD, [phone]  Problem List/Past Medical History    Ongoing     Asthma     Chronic venous insufficiency     Mild  intermittent asthma     Osteoarthritis of CMC joint of thumb     Osteopenia of the elderly     Seasonal allergies    Historical     Chicken pox     Pregnancy     Pregnancy     Pregnancy     Tobacco abuse       Comments: quit  Procedure/Surgical History     Extracapsular cataract extraction and insertion of intraocular lens (10/05/2017)      Comments: Left..     Extracapsular cataract extraction and insertion of intraocular lens (09/21/2017)      Comments: Right..     Colonoscopy (10/21/2016)      Comments: Indication:   screening      Sedation:   Midazolam 3mg, Fentanyl 150mcg--IV      Findings:  no polyps/abnormalities seen      Recommendation:   f/u only as needed..     Cholecystectomy with cholangiography (06/11/2013)     Colonoscopy (02/08/2006)      Comments: diverticulosis.    rpt 02/2009. Procedure done in [place].  Repeat in 3 years with yearly hemoccults..     Hernia repair (07/01/1994)      Comments: Right femoral hernia.     Breast biopsy and related procedures      Comments: benign.     cyst removal     Knee replacement     Tonsillectomy     venous ligation      Comments: both legs..  Medications    Advair Diskus 100 mcg-50 mcg inhalation powder, 1 puff(s), Inhale, bid, 11 refills    compression stockings, See Instructions, 5 refills    Fish Oil, Oral    Flonase 50 mcg/inh nasal spray, 100 mcg= 2 spray(s), Nasal, daily, 11 refills    MiraLax, 17 gm, Oral, daily    Multiple Vitamins oral tablet, 1 tab(s), Oral, daily    PreserVision AREDS 2, 1 cap(s), Oral, daily    ProAir HFA 90 mcg/inh inhalation aerosol, 2 puff(s), Inhale, q4 hrs, PRN, 11 refills  Allergies    Cipro (joint pain)    Dust    Mold    Pollen    Strawberries    adhesive tape (Rash)    doxycycline (systemic rxn)    sulfa drug (Difficulty breathing, Rash)  Social History    Smoking Status - 11/05/2019     Former smoker     Alcohol - Current, 11/06/2019      Wine (5 oz), Daily, 1 drinks/episode average. Ready to change: No., 11/06/2019      Employment/School      Retired, Highest education level: Master's., 11/06/2019     Exercise      Exercise frequency: 3-4 times/week. Exercise type: Aerobics, Yoga., 11/06/2019     Home/Environment      Marital status: . Spouse/Partner name: Tomy. Lives with Spouse. 3 children. Living situation: Home/Independent. Injuries/Abuse/Neglect in household: No. Feels unsafe at home: No. Family/Friends available for support: Yes., 11/06/2019     Nutrition/Health      Type of diet: Regular. Wants to lose weight: No. Sleeping concerns: No. Feels highly stressed: No., 11/06/2019     Sexual      Sexually active: No., 09/17/2015     Substance Abuse - Denies Substance Abuse, 11/06/2019      Never, 06/25/2012     Tobacco - Past, 11/06/2019      Quit in 1 982, Cigarettes, 4 per day. Total pack years: 15., 11/06/2019      Past, 06/25/2012  Family History    Alcoholism: Father.    Allergic rhinitis: Mother.    Arthritis: Mother and Father.    Breast cancer: Grandmother (P).    CA - Cancer of colon: Father.    Cancer: Negative: Daughter.    Crohns disease...: Daughter.    Dementia: Mother.    Kidney failure...: Father.    Sarcoidosis...: Mother.    Sister: History is unknown    Brother: History is unknown    Sister: History is unknown  Immunizations      Vaccine Date Status          influenza virus vaccine, inactivated 10/01/2019 Recorded          zoster vaccine, inactivated 06/14/2019 Recorded          zoster vaccine, inactivated 04/15/2019 Recorded          typhoid, inactivated 02/19/2019 Given          influenza virus vaccine, inactivated 10/11/2018 Given          influenza virus vaccine, inactivated 10/09/2017 Given          tetanus/diphth/pertuss (Tdap) adult/adol 10/09/2017 Recorded          influenza virus vaccine, inactivated 09/21/2016 Given          influenza virus vaccine, inactivated 10/19/2015 Given          influenza virus vaccine, inactivated 10/14/2014 Given          pneumococcal (PCV13) 10/14/2014 Given           typhoid, inactivated 02/10/2014 Given          influenza virus vaccine, inactivated 10/22/2013 Recorded          pneumococcal (PPSV23) 10/03/2011 Given          influenza virus vaccine, inactivated 10/03/2011 Given          influenza virus vaccine, inactivated 09/20/2010 Given          influenza, H1N1, inactivated 11/03/2009 Recorded          typhoid, inactivated 04/27/2009 Recorded          ZOS, shingles 04/27/2009 Recorded          tetanus/diphth/pertuss (Tdap) adult/adol 04/21/2008 Recorded          Hep A 07/01/1999 Recorded          typhoid, inactivated 11/01/1998 Recorded          Hep A 11/01/1998 Recorded          yellow fever 11/01/1998 Recorded

## 2022-02-16 NOTE — NURSING NOTE
CAGE Assessment Entered On:  11/12/2020 11:10 AM CST    Performed On:  11/12/2020 11:10 AM CST by Venice Heard LPN               Assessment   Have you ever felt you should cut down on your drinking :   No   Have people annoyed you by criticizing your drinking :   No   Have you ever felt bad or guilty about your drinking :   No   Have you ever taken a drink first thing in the morning to steady your nerves or get rid of a hangover (Eye-opener) :   No   CAGE Score :   0    Venice Heard LPN - 11/12/2020 11:10 AM CST

## 2022-02-16 NOTE — LETTER
(Inserted Image. Unable to display)   June 24, 2020      PETEY AZAR      [address]  [place] [id number]        Dear PETEY,    Thank you for selecting [facility] ([facility]viously [facility] for your healthcare needs.  Below you will find the results of your recent tests done at our clinic.     Results are all normal. Your joint pain is an unusual pattern without evidence inflammation. Please call after you have reviewed these results.      Result Name Current Result Previous Result Reference Range   Sodium Level (mmol/L)  140 6/18/2020  138 5/5/2020 135 - 146   Potassium Level (mmol/L)  4.0 6/18/2020  4.0 5/5/2020 3.5 - 5.3   Chloride Level (mmol/L)  103 6/18/2020  103 5/5/2020 98 - 110   CO2 Level (mmol/L)  29 6/18/2020  29 5/5/2020 20 - 32   Glucose Level (mg/dL)  92 6/18/2020 ((H)) 102 5/5/2020 65 - 99   BUN (mg/dL)  19 6/18/2020  20 5/5/2020 7 - 25   Creatinine Level (mg/dL)  0.78 6/18/2020  0.87 5/5/2020 0.60 - 0.93   eGFR (mL/min/1.73m2)  75 6/18/2020  66 5/5/2020 > OR = 60 -    Calcium Level (mg/dL)  10.2 6/18/2020  9.8 5/5/2020 8.6 - 10.4   Bilirubin Total (mg/dL)  0.6 6/18/2020  0.2 - 1.2   Alkaline Phosphatase (unit/L)  117 6/18/2020  37 - 153   AST/SGOT (unit/L)  16 6/18/2020  10 - 35   ALT/SGPT (unit/L)  13 6/18/2020  6 - 29   Protein Total (gm/dL)  6.7 6/18/2020  6.1 - 8.1   Albumin Level (gm/dL)  4.1 6/18/2020  3.6 - 5.1   Globulin  2.6 6/18/2020  1.9 - 3.7   WBC  5.3 6/18/2020  5.8 5/5/2020 3.8 - 10.8   RBC  4.70 6/18/2020  4.21 5/5/2020 3.80 - 5.10   Hgb (gm/dL)  14.6 6/18/2020  13.0 5/5/2020 11.7 - 15.5   Hct (%)  42.4 6/18/2020  37.2 5/5/2020 35.0 - 45.0   MCV (fL)  90.2 6/18/2020  88.4 5/5/2020 80.0 - 100.0   MCH (pg)  31.1 6/18/2020  30.9 5/5/2020 27.0 - 33.0   MCHC (gm/dL)  34.4 6/18/2020  34.9 5/5/2020 32.0 - 36.0   RDW (%)  12.9 6/18/2020  12.5 5/5/2020 11.0 - 15.0   Platelet  264 6/18/2020  298 5/5/2020 140 - 400   MPV (fL)  10.3 6/18/2020  10.1 5/5/2020  7.5 - 12.5   Lymphocytes (%)  25.7 6/18/2020     Abs Lymphocytes  1,362 6/18/2020  850 - 3,900   Neutrophils (%)  59 6/18/2020     Abs Neutrophils  3,127 6/18/2020  1,500 - 7,800   Monocytes (%)  11.9 6/18/2020     Abs Monocytes  631 6/18/2020  200 - 950   Eosinophils (%)  2.3 6/18/2020     Abs Eosinophils  122 6/18/2020  15 - 500   Basophils (%)  1.1 6/18/2020     Abs Basophils  58 6/18/2020  0 - 200   DAMIEN IFA  NEGATIVE 6/18/2020  NEGATIVE - NEGATIVE   Rheumatoid Factor (IU/mL)  <14 6/18/2020   - <14   Cyclic Citrullinated Peptide (CCP)  <16 6/18/2020     Lyme Ab IgG/IgM WB  <0.90 6/18/2020         Please contact me or my assistant at [phone] if you have any questions.     Sincerely,        Jan Felix MD

## 2022-02-16 NOTE — TELEPHONE ENCOUNTER
---------------------  From: Venice Heard LPN (Phone Messages Pool ([id number]_[facility]))   To: [employer] Message Pool ([id number] [facility] [place]);     Sent: 7/1/2020 12:38:45 PM CDT  Subject: FW: treatment plan of further tests           ---------------------  From: PETEY AZAR  To: Pending sale to [facility]  Sent: 07/01/2020 11:34 a.m. CDT  Subject: treatment plan of further tests  For Dr. Felix, Dr. Silverman:  I received a voicemail last Wednesday from Dr. Felix that my blood tests were normal.  Question now is: do I continue on the prednisone, at what strength and for how long, or are more tests needed?  I have been taking 7.5mg daily.  That dose makes me comfortable enough, though symptoms don t disappear.  Please advise next step.  Thanks!  Petey Azar---------------------  From: Elvin/Suzie GOLDBERG ([employer] Message Pool ([id number]))   To: Jan Felix MD;     Sent: 7/6/2020 8:13:26 AM CDT  Subject: FW: treatment plan of further tests

## 2022-02-16 NOTE — NURSING NOTE
Asthma Control Test (ACT) Total Entered On:  11/26/2021 10:37 AM CST    Performed On:  11/15/2021 10:37 AM CST by Brooke Rhoades               Asthma Control Test (ACT) Total   Asthma Control Test Total (Adult) :   21    Brooke Rhoades - 11/26/2021 10:37 AM CST

## 2022-02-16 NOTE — TELEPHONE ENCOUNTER
Entered by Gracy Ernst CMA on October 28, 2020 2:10:50 PM CDT  ---------------------  From: Gracy Ernst CMA   To: [facility]    Sent: 10/28/2020 2:10:50 PM CDT  Subject: Medication Management     ** Submitted: **  Order:fluticasone-salmeterol (Advair Diskus 100 mcg-50 mcg inhalation powder)  See Instructions  INHALE 1  PUFF TWICE A DAY  Qty:  1 EA        Refills:  0          Substitutions Allowed     Route To [facility]    Signed by Gracy Ernst CMA  10/28/2020 7:10:00 PM UT    ** Submitted: **  Complete:fluticasone-salmeterol (Advair Diskus 100 mcg-50 mcg inhalation powder)   Signed by Gracy Ernst CMA  10/28/2020 7:10:00 PM UT    ** Not Approved:  **  fluticasone-salmeterol (ADVAIR DISKUS 100/50 MG INHALANT)  INHALE 1  PUFF TWICE A DAY  Qty:  60 unknown unit        Days Supply:  0        Refills:  0          Substitutions Allowed     Route To [facility]   Signed by Gracy Ernst CMA            ** Submitted: **  Order:fluticasone nasal (fluticasone 50 mcg/inh nasal spray)  2 spray(s)  Nasal  daily  **DUE FOR APPT FOR FURTHER REFILLS**  Qty:  1 EA        Refills:  0          Substitutions Allowed     Route To [facility]    Signed by Gracy Ernst CMA  10/28/2020 7:09:00 PM UT    ** Submitted: **  Complete:fluticasone nasal (Flonase 50 mcg/inh nasal spray)   Signed by Gracy Ernst CMA  10/28/2020 7:10:00 PM UT    ** Not Approved:  **  fluticasone nasal (FLUTICASONE 50MCG SPR MG MIST(AEROSOL SPRAY))  USE 2 SPRAYS IN EACH NOSTRIL ONCE DAILY  Qty:  16 unknown unit        Days Supply:  0        Refills:  0          Substitutions Allowed     Route To [facility]   Signed by Gracy Ernst CMA              ** Patient matched by Brittany Singh MA on 10/28/2020 1:51:22 PM CDT **      ------------------------------------------  From: [facility]  To:  Herrera ARRIOLA, Josh  Sent: October 28, 2020 11:08:29 AM CDT  Subject: Medication Management  Due: October 8, 2020 2:04:31 PM CDT     ** On Hold Pending Signature **     Dispensed Drug: fluticasone-salmeterol (Advair Diskus 100 mcg-50 mcg inhalation powder), INHALE 1 PUFF TWICE A DAY  Quantity: 60 unknown unit  Days Supply: 0  Refills: 0  Substitutions Allowed  Notes from Pharmacy:     ** On Hold Pending Signature **     Dispensed Drug: fluticasone nasal (fluticasone 50 mcg/inh nasal spray), USE 2 SPRAYS IN EACH NOSTRIL ONCE DAILY  Quantity: 16 unknown unit  Days Supply: 0  Refills: 0  Substitutions Allowed  Notes from Pharmacy:  ------------------------------------------Spoke to patient at 1410, due for AWV, pt transferred to scheduling.  1 mo refills given per protocol.

## 2022-02-16 NOTE — TELEPHONE ENCOUNTER
PETEY AZAR : 1945 MRN: [id number]  PHONE NOTE:  The patient s workup was again negative.  Her symptoms resolved completely on prednisone, currently on 7.5 mg daily.    P: I discussed the options which would be gradual weaning of prednisone over a few months to see if symptoms recurred versus rheumatology consultation for diagnostic purposes and she prefers the latter of these options, which we will arrange.  In the meantime she will try to get down to 5 mg of prednisone.    D:  2020  T:  2020                                                Jan Felix MD, FACP/sq  c:  Josh Silverman MD

## 2022-05-24 ENCOUNTER — OFFICE VISIT (OUTPATIENT)
Dept: FAMILY MEDICINE | Facility: CLINIC | Age: 77
End: 2022-05-24
Payer: MEDICARE

## 2022-05-24 VITALS
HEIGHT: 66 IN | WEIGHT: 149.5 LBS | BODY MASS INDEX: 24.03 KG/M2 | TEMPERATURE: 98.2 F | DIASTOLIC BLOOD PRESSURE: 78 MMHG | SYSTOLIC BLOOD PRESSURE: 144 MMHG | HEART RATE: 61 BPM

## 2022-05-24 DIAGNOSIS — M25.552 HIP PAIN, LEFT: Primary | ICD-10-CM

## 2022-05-24 PROBLEM — H25.811 COMBINED FORMS OF AGE-RELATED CATARACT OF RIGHT EYE: Status: ACTIVE | Noted: 2017-09-11

## 2022-05-24 PROBLEM — J45.20 INTERMITTENT ASTHMA: Status: ACTIVE | Noted: 2022-05-24

## 2022-05-24 PROBLEM — J30.2 SEASONAL ALLERGIC RHINITIS: Status: ACTIVE | Noted: 2022-05-24

## 2022-05-24 PROBLEM — M35.3 PMR (POLYMYALGIA RHEUMATICA) (H): Status: ACTIVE | Noted: 2020-08-19

## 2022-05-24 PROBLEM — M18.0 PRIMARY OSTEOARTHRITIS OF BOTH FIRST CARPOMETACARPAL JOINTS: Status: ACTIVE | Noted: 2022-05-24

## 2022-05-24 PROBLEM — M19.90 ARTHRITIS: Status: ACTIVE | Noted: 2022-05-24

## 2022-05-24 PROBLEM — M81.0 OSTEOPOROSIS: Status: ACTIVE | Noted: 2022-05-24

## 2022-05-24 PROCEDURE — 99213 OFFICE O/P EST LOW 20 MIN: CPT | Performed by: FAMILY MEDICINE

## 2022-05-24 RX ORDER — DIPHENOXYLATE HYDROCHLORIDE AND ATROPINE SULFATE 2.5; .025 MG/1; MG/1
1 TABLET ORAL DAILY
COMMUNITY

## 2022-05-24 RX ORDER — VITC/E/ZINC/COPPER/LUTEIN/ZEAX 250 MG-200
1 TABLET,CHEWABLE ORAL DAILY
COMMUNITY

## 2022-05-24 RX ORDER — POLYETHYLENE GLYCOL 3350 17 G/17G
8 POWDER, FOR SOLUTION ORAL DAILY PRN
COMMUNITY

## 2022-05-24 RX ORDER — ALENDRONATE SODIUM 70 MG/1
70 TABLET ORAL WEEKLY
COMMUNITY
Start: 2021-11-15 | End: 2022-11-08

## 2022-05-24 RX ORDER — FLUTICASONE PROPIONATE 50 MCG
2 SPRAY, SUSPENSION (ML) NASAL DAILY
COMMUNITY
Start: 2021-11-15 | End: 2022-11-08

## 2022-05-24 RX ORDER — FLUTICASONE PROPIONATE AND SALMETEROL 100; 50 UG/1; UG/1
1 POWDER RESPIRATORY (INHALATION) 2 TIMES DAILY
COMMUNITY
End: 2022-11-08

## 2022-05-24 RX ORDER — ALBUTEROL SULFATE 90 UG/1
2 AEROSOL, METERED RESPIRATORY (INHALATION) EVERY 4 HOURS PRN
COMMUNITY
Start: 2021-11-15 | End: 2022-11-21

## 2022-05-24 RX ORDER — PREDNISONE 20 MG/1
20 TABLET ORAL DAILY
Qty: 7 TABLET | Refills: 0 | Status: SHIPPED | OUTPATIENT
Start: 2022-05-24 | End: 2022-05-31

## 2022-05-24 NOTE — PROGRESS NOTES
"  Assessment & Plan     Hip pain, left  Differential discussed including hip joint arthritis, SI joint dysfunction, radiculopathy  She has mild degenerative disease of the hip which makes this less likely as a source of pain  Trial of prednisone 20 mg daily and follow-up if not improving.  She may need further imaging to rule out significant disc disease versus spinal stenosis  - XR Hip Left 2-3 Views; Future  - predniSONE (DELTASONE) 20 MG tablet; Take 1 tablet (20 mg) by mouth daily for 7 days                 No follow-ups on file.    Josh Silverman MD  [facility]    Dee Baker is a 76 year old who presents for the following health issues  accompanied by her self.    Musculoskeletal Problem    History of Present Illness       Reason for visit:  Pain in left hip with tingling down front of leg  Symptom onset:  More than a month  Symptoms include:  Pain inleft hip with tingling down front of leg  Symptom intensity:  Moderate  Symptom progression:  Worsening  Had these symptoms before:  No  What makes it worse:  Sitting still  What makes it better:  Ibuprofin,ice    She eats 2-3 servings of fruits and vegetables daily.She consumes 0 sweetened beverage(s) daily.She exercises with enough effort to increase her heart rate 30 to 60 minutes per day.  She exercises with enough effort to increase her heart rate 6 days per week.   She is taking medications regularly.       Also check skin lesion nose.  Does have small, reddened spot on nose, denies itching.      Review of Systems   Constitutional, HEENT, cardiovascular, pulmonary, gi and gu systems are negative, except as otherwise noted.      Objective    BP (!) 144/78 (BP Location: Right arm, Cuff Size: Adult Regular)   Pulse 61   Temp 98.2  F (36.8  C) (Tympanic)   Ht 1.676 m (5' 6\")   Wt 67.8 kg (149 lb 8 oz)   BMI 24.13 kg/m    Body mass index is 24.13 kg/m .  Physical Exam   General:  Alert and oriented, No acute distress.   "   Eye:  Normal conjunctiva.     HENT:  Normocephalic.     Neck:  Supple, Non-tender.     Respiratory:  Respirations are non-labored.     Cardiovascular:  Normal rate.     Musculoskeletal:     Lower extremity exam:    Hip: Slightly decreased range of motion of left hip compared to the right  Thigh: Normal bulk and tone  Knee: Normal range of motion  Spine:   Thoracic: Nontender, good range of motion  Lumbar: Slight left SI tenderness good range of motion  Neurological: Normal deep tendon reflexes, negative SLR bilaterally  Psychiatric:  Cooperative, Appropriate mood & affect.         Left hip films show mild degenerative change

## 2022-06-06 ENCOUNTER — MYC MEDICAL ADVICE (OUTPATIENT)
Dept: FAMILY MEDICINE | Facility: CLINIC | Age: 77
End: 2022-06-06
Payer: MEDICARE

## 2022-06-06 DIAGNOSIS — M25.552 HIP PAIN, LEFT: Primary | ICD-10-CM

## 2022-06-14 ENCOUNTER — TRANSFERRED RECORDS (OUTPATIENT)
Dept: HEALTH INFORMATION MANAGEMENT | Facility: CLINIC | Age: 77
End: 2022-06-14

## 2022-07-27 ENCOUNTER — TRANSFERRED RECORDS (OUTPATIENT)
Dept: HEALTH INFORMATION MANAGEMENT | Facility: CLINIC | Age: 77
End: 2022-07-27

## 2022-08-01 ENCOUNTER — OFFICE VISIT (OUTPATIENT)
Dept: FAMILY MEDICINE | Facility: CLINIC | Age: 77
End: 2022-08-01
Payer: MEDICARE

## 2022-08-01 VITALS
HEART RATE: 72 BPM | SYSTOLIC BLOOD PRESSURE: 151 MMHG | BODY MASS INDEX: 24.32 KG/M2 | DIASTOLIC BLOOD PRESSURE: 85 MMHG | HEIGHT: 66 IN | TEMPERATURE: 97.6 F | WEIGHT: 151.3 LBS

## 2022-08-01 DIAGNOSIS — M25.542 ARTHRALGIA OF BOTH HANDS: Primary | ICD-10-CM

## 2022-08-01 DIAGNOSIS — M54.16 LUMBAR RADICULOPATHY: ICD-10-CM

## 2022-08-01 DIAGNOSIS — M25.541 ARTHRALGIA OF BOTH HANDS: Primary | ICD-10-CM

## 2022-08-01 PROCEDURE — 99214 OFFICE O/P EST MOD 30 MIN: CPT | Performed by: FAMILY MEDICINE

## 2022-08-01 RX ORDER — UREA 10 %
1 LOTION (ML) TOPICAL DAILY
COMMUNITY
Start: 2022-07-06

## 2022-08-01 RX ORDER — MULTIVITAMIN WITH IRON
1 TABLET ORAL DAILY
COMMUNITY
Start: 2022-07-06

## 2022-08-01 RX ORDER — PREDNISONE 5 MG/1
TABLET ORAL
Qty: 40 TABLET | Refills: 0 | Status: SHIPPED | OUTPATIENT
Start: 2022-08-01 | End: 2022-08-26

## 2022-08-01 ASSESSMENT — LIFESTYLE VARIABLES
HOW MANY STANDARD DRINKS CONTAINING ALCOHOL DO YOU HAVE ON A TYPICAL DAY: 1 OR 2
HOW OFTEN DO YOU HAVE A DRINK CONTAINING ALCOHOL: 2-4 TIMES A MONTH
AUDIT-C TOTAL SCORE: 2
HOW OFTEN DO YOU HAVE SIX OR MORE DRINKS ON ONE OCCASION: NEVER
SKIP TO QUESTIONS 9-10: 1

## 2022-08-01 NOTE — PROGRESS NOTES
Assessment & Plan     Arthralgia of both hands  Has history of polymyalgia rheumatica and remote history of rheumatoid arthritis.  We will treat this with a prednisone taper and follow-up as needed.  I have encouraged her to schedule a follow-up rheumatology visit toward the end of the month.  - predniSONE (DELTASONE) 5 MG tablet; Take 3 tablets (15 mg) by mouth daily for 5 days, THEN 2 tablets (10 mg) daily for 5 days, THEN 1.5 tablets (7.5 mg) daily for 5 days, THEN 1 tablet (5 mg) daily for 5 days, THEN 0.5 tablets (2.5 mg) daily for 5 days.    Lumbar radiculopathy  She has no red flag symptoms today.  I suspect spinal stenosis.  Treat with prednisone and follow-up over the next 7 to 10 days.  Consider MRI of the lumbar spine if this is not improving.  He has a single level problem and LAURA might be beneficial.  - predniSONE (DELTASONE) 5 MG tablet; Take 3 tablets (15 mg) by mouth daily for 5 days, THEN 2 tablets (10 mg) daily for 5 days, THEN 1.5 tablets (7.5 mg) daily for 5 days, THEN 1 tablet (5 mg) daily for 5 days, THEN 0.5 tablets (2.5 mg) daily for 5 days.                 No follow-ups on file.    Josh Silverman MD  [facility]    Dee Baker is a 76 year old accompanied by her self, presenting for the following health issues:  Musculoskeletal Problem (C/o hand and feet pain/stiffness > 1month.  Also c/o back pain that radiates down left leg since March.   Has been going to PT)      History of Present Illness       Reason for visit:  Pain and stiffness in hands and feet, especially first thing in the morning  Symptom onset:  More than a month  Symptoms include:  Worsening pain and stiffness in hands  Symptom intensity:  Moderate  Symptom progression:  Worsening  Had these symptoms before:  No  What makes it worse:  No  What makes it better:  MovementShe consumes 0 sweetened beverage(s) daily.She exercises with enough effort to increase her heart rate 30 to 60 minutes  "per day.  She exercises with enough effort to increase her heart rate 5 days per week.   She is taking medications regularly.     She wakes up early in the morning with hand and foot pain and stiffness.  Pain improves during the day but stiffness.  She has left buttock/back pain that radiates to the leg that has been worsening since March.        Review of Systems   Constitutional, HEENT, cardiovascular, pulmonary, gi and gu systems are negative, except as otherwise noted.      Objective    BP (!) 151/85 (BP Location: Right arm, Cuff Size: Adult Regular)   Pulse 72   Temp 97.6  F (36.4  C) (Tympanic)   Ht 1.676 m (5' 6\")   Wt 68.6 kg (151 lb 4.8 oz)   BMI 24.42 kg/m    Body mass index is 24.42 kg/m .  Physical Exam   Alert, oriented, no acute distress  Normal heart rate  Nonlabored breathing  Exam of the upper extremities reveals mild tenderness and swelling in the fingers  Lower extremity exam reveals normal strength, normal sensation, slightly decreased deep tendon reflexes on the left, mildly positive straight leg raise on the left.                    .  ..  "

## 2022-08-29 ENCOUNTER — MYC MEDICAL ADVICE (OUTPATIENT)
Dept: FAMILY MEDICINE | Facility: CLINIC | Age: 77
End: 2022-08-29

## 2022-08-29 DIAGNOSIS — M54.16 LUMBAR RADICULOPATHY: Primary | ICD-10-CM

## 2022-08-29 NOTE — TELEPHONE ENCOUNTER
Please see and advise on patient MyChart message.    Patient requesting next steps in plan of care, MRI?    Misha Jackson RN  [facility]

## 2022-08-31 ENCOUNTER — TRANSFERRED RECORDS (OUTPATIENT)
Dept: HEALTH INFORMATION MANAGEMENT | Facility: CLINIC | Age: 77
End: 2022-08-31

## 2022-09-28 ENCOUNTER — TRANSFERRED RECORDS (OUTPATIENT)
Dept: HEALTH INFORMATION MANAGEMENT | Facility: CLINIC | Age: 77
End: 2022-09-28

## 2022-10-03 ENCOUNTER — HEALTH MAINTENANCE LETTER (OUTPATIENT)
Age: 77
End: 2022-10-03

## 2022-10-26 ENCOUNTER — TRANSFERRED RECORDS (OUTPATIENT)
Dept: HEALTH INFORMATION MANAGEMENT | Facility: CLINIC | Age: 77
End: 2022-10-26

## 2022-11-02 DIAGNOSIS — M81.0 AGE-RELATED OSTEOPOROSIS WITHOUT CURRENT PATHOLOGICAL FRACTURE: ICD-10-CM

## 2022-11-02 DIAGNOSIS — J45.20 MILD INTERMITTENT ASTHMA WITHOUT COMPLICATION: Primary | ICD-10-CM

## 2022-11-07 NOTE — TELEPHONE ENCOUNTER
Routing refill request to provider for review/approval because:  Medication is reported/historical     No ACT for asthma inhalers done. Does not meet [facility] protocol  No flowsheet data found.       LISHA Whitaker  [facility]

## 2022-11-08 RX ORDER — CEPHALEXIN 250 MG/1
CAPSULE ORAL
Qty: 180 EACH | Refills: 3 | Status: SHIPPED | OUTPATIENT
Start: 2022-11-08 | End: 2023-11-14

## 2022-11-08 RX ORDER — FLUTICASONE PROPIONATE 50 MCG
SPRAY, SUSPENSION (ML) NASAL
Qty: 48 G | Refills: 3 | Status: SHIPPED | OUTPATIENT
Start: 2022-11-08 | End: 2023-11-14

## 2022-11-08 RX ORDER — ALENDRONATE SODIUM 70 MG/1
TABLET ORAL
Qty: 13 TABLET | Refills: 3 | Status: SHIPPED | OUTPATIENT
Start: 2022-11-08 | End: 2023-10-31

## 2022-11-14 ASSESSMENT — ENCOUNTER SYMPTOMS
CONSTIPATION: 0
HEMATOCHEZIA: 0
ARTHRALGIAS: 1
FREQUENCY: 0
HEADACHES: 0
PALPITATIONS: 0
NERVOUS/ANXIOUS: 0
ABDOMINAL PAIN: 0
DIARRHEA: 0
DYSURIA: 0
FEVER: 0
NAUSEA: 0
HEMATURIA: 0
MYALGIAS: 0
SHORTNESS OF BREATH: 0
DIZZINESS: 0
SORE THROAT: 0
EYE PAIN: 0
WEAKNESS: 0
BREAST MASS: 0
PARESTHESIAS: 0
JOINT SWELLING: 0
HEARTBURN: 0
CHILLS: 0
COUGH: 0

## 2022-11-14 ASSESSMENT — ASTHMA QUESTIONNAIRES
QUESTION_2 LAST FOUR WEEKS HOW OFTEN HAVE YOU HAD SHORTNESS OF BREATH: ONCE OR TWICE A WEEK
ACT_TOTALSCORE: 22
QUESTION_4 LAST FOUR WEEKS HOW OFTEN HAVE YOU USED YOUR RESCUE INHALER OR NEBULIZER MEDICATION (SUCH AS ALBUTEROL): ONCE A WEEK OR LESS
QUESTION_3 LAST FOUR WEEKS HOW OFTEN DID YOUR ASTHMA SYMPTOMS (WHEEZING, COUGHING, SHORTNESS OF BREATH, CHEST TIGHTNESS OR PAIN) WAKE YOU UP AT NIGHT OR EARLIER THAN USUAL IN THE MORNING: NOT AT ALL
QUESTION_5 LAST FOUR WEEKS HOW WOULD YOU RATE YOUR ASTHMA CONTROL: WELL CONTROLLED
QUESTION_1 LAST FOUR WEEKS HOW MUCH OF THE TIME DID YOUR ASTHMA KEEP YOU FROM GETTING AS MUCH DONE AT WORK, SCHOOL OR AT HOME: NONE OF THE TIME
ACT_TOTALSCORE: 22

## 2022-11-14 ASSESSMENT — ACTIVITIES OF DAILY LIVING (ADL): CURRENT_FUNCTION: NO ASSISTANCE NEEDED

## 2022-11-18 ENCOUNTER — TRANSFERRED RECORDS (OUTPATIENT)
Dept: HEALTH INFORMATION MANAGEMENT | Facility: CLINIC | Age: 77
End: 2022-11-18

## 2022-11-21 ENCOUNTER — OFFICE VISIT (OUTPATIENT)
Dept: FAMILY MEDICINE | Facility: CLINIC | Age: 77
End: 2022-11-21
Payer: MEDICARE

## 2022-11-21 VITALS
BODY MASS INDEX: 23.99 KG/M2 | OXYGEN SATURATION: 98 % | SYSTOLIC BLOOD PRESSURE: 131 MMHG | HEIGHT: 66 IN | HEART RATE: 76 BPM | DIASTOLIC BLOOD PRESSURE: 78 MMHG | WEIGHT: 149.3 LBS | TEMPERATURE: 97.6 F

## 2022-11-21 DIAGNOSIS — M81.0 AGE-RELATED OSTEOPOROSIS WITHOUT CURRENT PATHOLOGICAL FRACTURE: ICD-10-CM

## 2022-11-21 DIAGNOSIS — M54.16 LUMBAR RADICULOPATHY: ICD-10-CM

## 2022-11-21 DIAGNOSIS — Z00.00 ENCOUNTER FOR MEDICARE ANNUAL WELLNESS EXAM: Primary | ICD-10-CM

## 2022-11-21 DIAGNOSIS — J45.20 MILD INTERMITTENT ASTHMA WITHOUT COMPLICATION: ICD-10-CM

## 2022-11-21 PROBLEM — H25.811 COMBINED FORMS OF AGE-RELATED CATARACT OF RIGHT EYE: Status: RESOLVED | Noted: 2017-09-11 | Resolved: 2022-11-21

## 2022-11-21 PROBLEM — M35.3 PMR (POLYMYALGIA RHEUMATICA) (H): Status: RESOLVED | Noted: 2020-08-19 | Resolved: 2022-11-21

## 2022-11-21 PROCEDURE — G0439 PPPS, SUBSEQ VISIT: HCPCS | Performed by: FAMILY MEDICINE

## 2022-11-21 RX ORDER — ALBUTEROL SULFATE 90 UG/1
2 AEROSOL, METERED RESPIRATORY (INHALATION) EVERY 4 HOURS PRN
Qty: 18 G | Refills: 3 | Status: SHIPPED | OUTPATIENT
Start: 2022-11-21 | End: 2024-04-24

## 2022-11-21 ASSESSMENT — ENCOUNTER SYMPTOMS
NERVOUS/ANXIOUS: 0
ARTHRALGIAS: 1
NAUSEA: 0
HEMATOCHEZIA: 0
WEAKNESS: 0
MYALGIAS: 0
PARESTHESIAS: 0
SORE THROAT: 0
DYSURIA: 0
DIARRHEA: 0
CHILLS: 0
COUGH: 0
FREQUENCY: 0
SHORTNESS OF BREATH: 0
FEVER: 0
PALPITATIONS: 0
HEARTBURN: 0
DIZZINESS: 0
HEADACHES: 0
CONSTIPATION: 0
HEMATURIA: 0
BREAST MASS: 0
JOINT SWELLING: 0
EYE PAIN: 0
ABDOMINAL PAIN: 0

## 2022-11-21 ASSESSMENT — ACTIVITIES OF DAILY LIVING (ADL): CURRENT_FUNCTION: NO ASSISTANCE NEEDED

## 2022-11-21 NOTE — PATIENT INSTRUCTIONS
Patient Education   Personalized Prevention Plan  You are due for the preventive services outlined below.  Your care team is available to assist you in scheduling these services.  If you have already completed any of these items, please share that information with your care team to update in your medical record.  Health Maintenance Due   Topic Date Due     ANNUAL REVIEW OF HM ORDERS  Never done     Asthma Action Plan - yearly  Never done     Hepatitis C Screening  Never done     Cholesterol Lab  07/29/2019

## 2022-11-21 NOTE — PROGRESS NOTES
"SUBJECTIVE:   Olivia is a 76 year old who presents for Preventive Visit.    Patient has been advised of split billing requirements and indicates understanding: Yes  Are you in the first 12 months of your Medicare coverage?  No    Healthy Habits:     In general, how would you rate your overall health?  Good    Frequency of exercise:  4-5 days/week    Duration of exercise:  30-45 minutes    Do you usually eat at least 4 servings of fruit and vegetables a day, include whole grains    & fiber and avoid regularly eating high fat or \"junk\" foods?  Yes    Taking medications regularly:  Yes    Medication side effects:  None    Ability to successfully perform activities of daily living:  No assistance needed    Home Safety:  No safety concerns identified    Hearing Impairment:  No hearing concerns    In the past 6 months, have you been bothered by leaking of urine?  No    In general, how would you rate your overall mental or emotional health?  Good      PHQ-2 Total Score: 0    Additional concerns today:  No    Asthma and osteoporosis are well controlled.  She is tolerating current medications.  She recently had an epidural steroid injection for lumbar radiculopathy.  She has noticed improvement since the injection.  She is considering a second injection after the first of the year.  She is planning on traveling to [facility] in [place] to visit her son and daughter.  Have you ever done Advance Care Planning? (For example, a Health Directive, POLST, or a discussion with a medical provider or your loved ones about your wishes): Yes, advance care planning is on file.      Right Ear:      1000 Hz RESPONSE- on Level:   20 db  (Conditioning sound)   1000 Hz: RESPONSE- on Level:   20 db    2000 Hz: RESPONSE- on Level:   20 db    4000 Hz: RESPONSE- on Level:   20 db     Left Ear:      4000 Hz: RESPONSE- on Level:   20 db    2000 Hz: RESPONSE- on Level:   20 db    1000 Hz: RESPONSE- on Level:   20 db     500 Hz: RESPONSE- on Level:   20 " db     Right Ear:    500 Hz: RESPONSE- on Level:   20 db     Hearing Acuity: Pass    Hearing Assessment: normal   Fall risk  Fallen 2 or more times in the past year?: No  Any fall with injury in the past year?: No    Cognitive Screening   1) Repeat 3 items Banana, Sunrise, Chair  2) Clock draw: NORMAL  3) 3 item recall: Recalls 3 objects  Results: 3 items recalled: COGNITIVE IMPAIRMENT LESS LIKELY    Mini-CogTM Copyright SINA Perdomo. Licensed by the author for use in [facility]; reprinted with permission (ingrid@[facility]). All rights reserved.          Reviewed and updated as needed this visit by clinical staff   Tobacco  Allergies  Meds              Reviewed and updated as needed this visit by Provider                 Social History     Tobacco Use     Smoking status: Former     Packs/day: 0.20     Years: 20.00     Pack years: 4.00     Types: Cigarettes     Start date: 1963     Quit date: 5/10/1984     Years since quittin.5     Smokeless tobacco: Never   Substance Use Topics     Alcohol use: Yes     Alcohol/week: 1.0 standard drink     Types: 1 Glasses of wine per week     Comment: wine with dinner     If you drink alcohol do you typically have >3 drinks per day or >7 drinks per week? No    No flowsheet data found.    Current providers sharing in care for this patient include:   Patient Care Team:  Josh Silverman MD as PCP - General (Family Medicine)  Josh Silverman MD as Assigned PCP    The following health maintenance items are reviewed in Epic and correct as of today:  Health Maintenance   Topic Date Due     ANNUAL REVIEW OF HM ORDERS  Never done     ASTHMA ACTION PLAN  Never done     HEPATITIS C SCREENING  Never done     LIPID  2019     MEDICARE ANNUAL WELLNESS VISIT  11/15/2022     ASTHMA CONTROL TEST  2023     FALL RISK ASSESSMENT  2023     DTAP/TDAP/TD IMMUNIZATION (3 - Td or Tdap) 10/09/2027     ADVANCE CARE PLANNING  2027     DEXA  2036     PHQ-2  "(once per calendar year)  Completed     INFLUENZA VACCINE  Completed     Pneumococcal Vaccine: 65+ Years  Completed     ZOSTER IMMUNIZATION  Completed     COVID-19 Vaccine  Completed     IPV IMMUNIZATION  Aged Out     MENINGITIS IMMUNIZATION  Aged Out     Labs reviewed in EPIC      Mammogram Screening - Patient over age 75, has elected to continue with screening.  Pertinent mammograms are reviewed under the imaging tab.    Review of Systems   Constitutional: Negative for chills and fever.   HENT: Negative for congestion, ear pain, hearing loss and sore throat.    Eyes: Negative for pain and visual disturbance.   Respiratory: Negative for cough and shortness of breath.    Cardiovascular: Negative for chest pain, palpitations and peripheral edema.   Gastrointestinal: Negative for abdominal pain, constipation, diarrhea, heartburn, hematochezia and nausea.   Breasts:  Negative for tenderness, breast mass and discharge.   Genitourinary: Negative for dysuria, frequency, genital sores, hematuria, pelvic pain, urgency, vaginal bleeding and vaginal discharge.   Musculoskeletal: Positive for arthralgias. Negative for joint swelling and myalgias.   Skin: Negative for rash.   Neurological: Negative for dizziness, weakness, headaches and paresthesias.   Psychiatric/Behavioral: Negative for mood changes. The patient is not nervous/anxious.          OBJECTIVE:   /78 (BP Location: Right arm, Patient Position: Sitting, Cuff Size: Adult Regular)   Pulse 76   Temp 97.6  F (36.4  C) (Tympanic)   Ht 1.676 m (5' 6\")   Wt 67.7 kg (149 lb 4.8 oz)   SpO2 98%   BMI 24.10 kg/m   Estimated body mass index is 24.1 kg/m  as calculated from the following:    Height as of this encounter: 1.676 m (5' 6\").    Weight as of this encounter: 67.7 kg (149 lb 4.8 oz).  Physical Exam  GENERAL: healthy, alert and no distress  EYES: Eyes grossly normal to inspection, PERRL and conjunctivae and sclerae normal  NECK: no adenopathy, no asymmetry, " masses, or scars and thyroid normal to palpation  RESP: lungs clear to auscultation - no rales, rhonchi or wheezes  CV: regular rate and rhythm, normal S1 S2, no S3 or S4, no murmur, click or rub, no peripheral edema and peripheral pulses strong  ABDOMEN: soft, nontender, no hepatosplenomegaly, no masses and bowel sounds normal  MS: no gross musculoskeletal defects noted, no edema  PSYCH: mentation appears normal, affect normal/bright        ASSESSMENT / PLAN:   Olivia was seen today for wellness visit.    Diagnoses and all orders for this visit:    Encounter for Medicare annual wellness exam    Mild intermittent asthma without complication  -     albuterol (PROAIR HFA/PROVENTIL HFA/VENTOLIN HFA) 108 (90 Base) MCG/ACT inhaler; Inhale 2 puffs into the lungs every 4 hours as needed for wheezing    Age-related osteoporosis without current pathological fracture    Lumbar radiculopathy    Other orders  -     REVIEW OF HEALTH MAINTENANCE PROTOCOL ORDERS        Patient has been advised of split billing requirements and indicates understanding: Yes      COUNSELING:  Reviewed preventive health counseling, as reflected in patient instructions       Regular exercise       Healthy diet/nutrition       Vision screening       Hearing screening       Dental care       Bladder control       Fall risk prevention       Osteoporosis prevention/bone health        She reports that she quit smoking about 38 years ago. Her smoking use included cigarettes. She started smoking about 59 years ago. She has a 4.00 pack-year smoking history. She has never used smokeless tobacco.      Appropriate preventive services were discussed with this patient, including applicable screening as appropriate for cardiovascular disease, diabetes, osteopenia/osteoporosis, and glaucoma.  As appropriate for age/gender, discussed screening for colorectal cancer, prostate cancer, breast cancer, and cervical cancer. Checklist reviewing preventive services available has  been given to the patient.    Reviewed patients plan of care and provided an AVS. The Basic Care Plan (routine screening as documented in Health Maintenance) for Olivia meets the Care Plan requirement. This Care Plan has been established and reviewed with the Patient.      Josh Silverman MD  [facility]    Identified Health Risks:

## 2023-04-17 ENCOUNTER — OFFICE VISIT (OUTPATIENT)
Dept: FAMILY MEDICINE | Facility: CLINIC | Age: 78
End: 2023-04-17
Payer: MEDICARE

## 2023-04-17 VITALS
TEMPERATURE: 98 F | SYSTOLIC BLOOD PRESSURE: 131 MMHG | OXYGEN SATURATION: 98 % | DIASTOLIC BLOOD PRESSURE: 83 MMHG | RESPIRATION RATE: 16 BRPM | HEART RATE: 62 BPM | HEIGHT: 66 IN | BODY MASS INDEX: 24.11 KG/M2 | WEIGHT: 150 LBS

## 2023-04-17 DIAGNOSIS — L82.0 INFLAMED SEBORRHEIC KERATOSIS: Primary | ICD-10-CM

## 2023-04-17 PROCEDURE — 99213 OFFICE O/P EST LOW 20 MIN: CPT | Performed by: FAMILY MEDICINE

## 2023-04-17 ASSESSMENT — PAIN SCALES - GENERAL: PAINLEVEL: NO PAIN (0)

## 2023-04-17 NOTE — PROGRESS NOTES
"  Assessment & Plan     Inflamed seborrheic keratosis  We have discussed the benign nature of the lesions and we will continue to monitor.                   Josh Silverman MD  [facility]    Dee Baker is a 77 year old, presenting for the following health issues:  Skin Check (Atypical growth/mole on left forearm.)        4/17/2023    10:23 AM   Additional Questions   Roomed by SRud   Accompanied by n/a     History of Present Illness       Reason for visit:  A skin growth that I would like looked at, and a general skin exam.    She eats 2-3 servings of fruits and vegetables daily.She consumes 0 sweetened beverage(s) daily.She exercises with enough effort to increase her heart rate 30 to 60 minutes per day.  She exercises with enough effort to increase her heart rate 6 days per week.   She is taking medications regularly.       Patient is here to discuss a changing skin lesion on her arm.  She is concerned about melanoma.  Her daughter was recently diagnosed with a melanoma on her foot.  Patient has had an actinic keratosis treated with liquid nitrogen on her nose in the past.        Review of Systems   Constitutional, HEENT, cardiovascular, pulmonary, gi and gu systems are negative, except as otherwise noted.      Objective    /83 (BP Location: Right arm, Patient Position: Sitting)   Pulse 62   Temp 98  F (36.7  C) (Tympanic)   Resp 16   Ht 1.676 m (5' 6\")   Wt 68 kg (150 lb)   LMP  (LMP Unknown)   SpO2 98%   BMI 24.21 kg/m    Body mass index is 24.21 kg/m .  Physical Exam   Alert, oriented, no acute distress  Skin exam reveals scattered brown macules, lesion of concern on the left arm is a 7 mm brown slightly irritated, raised lesion                    "

## 2023-05-11 ENCOUNTER — OFFICE VISIT (OUTPATIENT)
Dept: FAMILY MEDICINE | Facility: CLINIC | Age: 78
End: 2023-05-11
Payer: MEDICARE

## 2023-05-11 VITALS
OXYGEN SATURATION: 98 % | HEART RATE: 93 BPM | DIASTOLIC BLOOD PRESSURE: 64 MMHG | WEIGHT: 152.3 LBS | BODY MASS INDEX: 24.48 KG/M2 | RESPIRATION RATE: 24 BRPM | SYSTOLIC BLOOD PRESSURE: 148 MMHG | HEIGHT: 66 IN | TEMPERATURE: 98.5 F

## 2023-05-11 DIAGNOSIS — R07.1 CHEST PAIN ON BREATHING: ICD-10-CM

## 2023-05-11 DIAGNOSIS — J01.10 ACUTE NON-RECURRENT FRONTAL SINUSITIS: Primary | ICD-10-CM

## 2023-05-11 PROCEDURE — 99213 OFFICE O/P EST LOW 20 MIN: CPT

## 2023-05-11 PROCEDURE — 93000 ELECTROCARDIOGRAM COMPLETE: CPT

## 2023-05-11 RX ORDER — BENZONATATE 100 MG/1
100 CAPSULE ORAL 2 TIMES DAILY PRN
Qty: 20 CAPSULE | Refills: 0 | Status: SHIPPED | OUTPATIENT
Start: 2023-05-11 | End: 2023-05-21

## 2023-05-11 RX ORDER — PREDNISONE 20 MG/1
20 TABLET ORAL DAILY
Qty: 5 TABLET | Refills: 0 | Status: SHIPPED | OUTPATIENT
Start: 2023-05-11 | End: 2023-05-16

## 2023-05-11 ASSESSMENT — ASTHMA QUESTIONNAIRES
ACT_TOTALSCORE: 21
QUESTION_3 LAST FOUR WEEKS HOW OFTEN DID YOUR ASTHMA SYMPTOMS (WHEEZING, COUGHING, SHORTNESS OF BREATH, CHEST TIGHTNESS OR PAIN) WAKE YOU UP AT NIGHT OR EARLIER THAN USUAL IN THE MORNING: NOT AT ALL
QUESTION_4 LAST FOUR WEEKS HOW OFTEN HAVE YOU USED YOUR RESCUE INHALER OR NEBULIZER MEDICATION (SUCH AS ALBUTEROL): ONE OR TWO TIMES PER DAY
QUESTION_1 LAST FOUR WEEKS HOW MUCH OF THE TIME DID YOUR ASTHMA KEEP YOU FROM GETTING AS MUCH DONE AT WORK, SCHOOL OR AT HOME: NONE OF THE TIME
QUESTION_5 LAST FOUR WEEKS HOW WOULD YOU RATE YOUR ASTHMA CONTROL: WELL CONTROLLED
ACT_TOTALSCORE: 21
QUESTION_2 LAST FOUR WEEKS HOW OFTEN HAVE YOU HAD SHORTNESS OF BREATH: NOT AT ALL

## 2023-05-11 ASSESSMENT — ENCOUNTER SYMPTOMS: COUGH: 1

## 2023-05-11 NOTE — PROGRESS NOTES
Assessment & Plan     Acute non-recurrent frontal sinusitis  Harsh cough frequent in office today.  Some erythema of posterior oropharynx suggesting nasal drip.  Some frontal sinus tenderness on exam.  Patient reports headache and head pressure with coughing.  Instructed to return to clinic for failure to improve or worsening symptoms after 2 to 3 days of medication.  - predniSONE (DELTASONE) 20 MG tablet; Take 1 tablet (20 mg) by mouth daily for 5 days  - amoxicillin-clavulanate (AUGMENTIN) 875-125 MG tablet; Take 1 tablet by mouth 2 times daily for 7 days  - benzonatate (TESSALON) 100 MG capsule; Take 1 capsule (100 mg) by mouth 2 times daily as needed for cough    Chest pain on breathing  Rule out acute cardiac changes with EKG today.  Normal sinus rhythm with no concerning features.  Patient instructed should chest pain started to radiate or be accompanied by new or worsening symptoms to seek immediate medical care  - EKG 12-lead complete w/read - Clinics                 Harleen Salazar, PRATIMA HAMILTON [facility]    Dee Baker is a 77 year old, presenting for the following health issues:  Cough (Productive cough, body aches, chills/hot sweats, and headache x 10 days)        5/11/2023    11:53 AM   Additional Questions   Roomed by Laura     Olivia is a 77-year-old female ambulatory to clinic accompanied by self.  She is here with 10 days of symptoms including congestion, chest pain that radiates to back, fatigue loss of appetite and most bothersome at this time a harsh cough.  She reports she has had chills and sweats and is cold in office today.  No nausea vomiting or diarrhea.    Cough    History of Present Illness       Reason for visit:  I'm on day 10 of a cold that started to improve and recently has become much worse.  Symptoms include:  Congestion, chest hurts, back hurts, glands in neck hurt, head hurts when I cough, fatigue, loss of apetite.  Symptom intensity:   "Moderate  Symptom progression:  Worsening  Had these symptoms before:  Yes  Has tried/received treatment for these symptoms:  Yes  Previous treatment was successful:  Yes  Prior treatment description:  Azythormcine  What makes it worse:  Moving around and lying down for any length of time  What makes it better:  Steaming my face helps a little    She eats 4 or more servings of fruits and vegetables daily.She consumes 0 sweetened beverage(s) daily.She exercises with enough effort to increase her heart rate 30 to 60 minutes per day.  She exercises with enough effort to increase her heart rate 5 days per week.   She is taking medications regularly.       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       Constitutional, HEENT, cardiovascular, pulmonary, gi and gu systems are negative, except as otherwise noted.      Objective    BP (!) 162/74 (BP Location: Right arm, Patient Position: Sitting, Cuff Size: Adult Regular)   Pulse 93   Temp 98.5  F (36.9  C) (Oral)   Resp 24   Ht 1.676 m (5' 6\")   Wt 69.1 kg (152 lb 4.8 oz)   LMP  (LMP Unknown)   SpO2 98%   BMI 24.58 kg/m    Body mass index is 24.58 kg/m .  Physical Exam  Constitutional:       Appearance: Normal appearance.   HENT:      Head: Atraumatic.      Right Ear: Tympanic membrane normal.      Left Ear: Tympanic membrane normal.      Nose: Congestion present.      Mouth/Throat:      Mouth: " Mucous membranes are moist.      Pharynx: Oropharynx is clear. Posterior oropharyngeal erythema present.   Eyes:      Extraocular Movements: Extraocular movements intact.      Conjunctiva/sclera: Conjunctivae normal.   Cardiovascular:      Rate and Rhythm: Normal rate and regular rhythm.      Pulses: Normal pulses.      Heart sounds: Normal heart sounds.   Pulmonary:      Breath sounds: Rhonchi present.      Comments: Bronchial rhonchi.  Abdominal:      General: Bowel sounds are normal.      Palpations: Abdomen is soft.   Musculoskeletal:         General: Normal range of motion.      Cervical back: Neck supple. No tenderness.   Lymphadenopathy:      Cervical: No cervical adenopathy.   Skin:     General: Skin is warm and dry.      Capillary Refill: Capillary refill takes less than 2 seconds.   Neurological:      Mental Status: She is alert and oriented to person, place, and time. Mental status is at baseline.

## 2023-10-30 DIAGNOSIS — J45.20 MILD INTERMITTENT ASTHMA WITHOUT COMPLICATION: ICD-10-CM

## 2023-10-31 RX ORDER — ALENDRONATE SODIUM 70 MG/1
TABLET ORAL
Qty: 13 TABLET | Refills: 3 | Status: SHIPPED | OUTPATIENT
Start: 2023-10-31

## 2023-11-14 DIAGNOSIS — M81.0 AGE-RELATED OSTEOPOROSIS WITHOUT CURRENT PATHOLOGICAL FRACTURE: ICD-10-CM

## 2023-11-14 RX ORDER — CEPHALEXIN 250 MG/1
CAPSULE ORAL
Qty: 180 EACH | Refills: 3 | Status: SHIPPED | OUTPATIENT
Start: 2023-11-14 | End: 2024-01-22

## 2023-11-14 RX ORDER — FLUTICASONE PROPIONATE 50 MCG
SPRAY, SUSPENSION (ML) NASAL
Qty: 48 G | Refills: 1 | Status: SHIPPED | OUTPATIENT
Start: 2023-11-14 | End: 2024-07-17

## 2023-11-20 ASSESSMENT — ENCOUNTER SYMPTOMS
JOINT SWELLING: 0
COUGH: 0
BREAST MASS: 0
DYSURIA: 0
ABDOMINAL PAIN: 0
FEVER: 0
DIZZINESS: 0
WEAKNESS: 0
HEADACHES: 0
HEMATURIA: 0
PALPITATIONS: 0
CHILLS: 0
SHORTNESS OF BREATH: 0
HEARTBURN: 0
NAUSEA: 0
FREQUENCY: 0
SORE THROAT: 0
NERVOUS/ANXIOUS: 0
EYE PAIN: 0
PARESTHESIAS: 0
CONSTIPATION: 0
HEMATOCHEZIA: 0
DIARRHEA: 0
ARTHRALGIAS: 1
MYALGIAS: 0

## 2023-11-20 ASSESSMENT — ASTHMA QUESTIONNAIRES: ACT_TOTALSCORE: 22

## 2023-11-20 ASSESSMENT — ACTIVITIES OF DAILY LIVING (ADL): CURRENT_FUNCTION: NO ASSISTANCE NEEDED

## 2023-11-27 ENCOUNTER — OFFICE VISIT (OUTPATIENT)
Dept: FAMILY MEDICINE | Facility: CLINIC | Age: 78
End: 2023-11-27
Payer: MEDICARE

## 2023-11-27 VITALS
HEIGHT: 66 IN | RESPIRATION RATE: 16 BRPM | SYSTOLIC BLOOD PRESSURE: 142 MMHG | TEMPERATURE: 97.8 F | BODY MASS INDEX: 24.23 KG/M2 | DIASTOLIC BLOOD PRESSURE: 73 MMHG | HEART RATE: 67 BPM | WEIGHT: 150.8 LBS | OXYGEN SATURATION: 96 %

## 2023-11-27 DIAGNOSIS — Z00.00 ENCOUNTER FOR MEDICARE ANNUAL WELLNESS EXAM: Primary | ICD-10-CM

## 2023-11-27 DIAGNOSIS — J45.20 MILD INTERMITTENT ASTHMA WITHOUT COMPLICATION: ICD-10-CM

## 2023-11-27 DIAGNOSIS — M81.0 AGE-RELATED OSTEOPOROSIS WITHOUT CURRENT PATHOLOGICAL FRACTURE: ICD-10-CM

## 2023-11-27 DIAGNOSIS — Z13.220 SCREENING FOR HYPERLIPIDEMIA: ICD-10-CM

## 2023-11-27 DIAGNOSIS — M18.0 PRIMARY OSTEOARTHRITIS OF BOTH FIRST CARPOMETACARPAL JOINTS: ICD-10-CM

## 2023-11-27 LAB
CHOLEST SERPL-MCNC: 245 MG/DL
HDLC SERPL-MCNC: 116 MG/DL
LDLC SERPL CALC-MCNC: 107 MG/DL
NONHDLC SERPL-MCNC: 129 MG/DL
TRIGL SERPL-MCNC: 111 MG/DL

## 2023-11-27 PROCEDURE — G0439 PPPS, SUBSEQ VISIT: HCPCS | Performed by: FAMILY MEDICINE

## 2023-11-27 PROCEDURE — 80061 LIPID PANEL: CPT | Performed by: FAMILY MEDICINE

## 2023-11-27 PROCEDURE — 99213 OFFICE O/P EST LOW 20 MIN: CPT | Mod: 25 | Performed by: FAMILY MEDICINE

## 2023-11-27 PROCEDURE — 36415 COLL VENOUS BLD VENIPUNCTURE: CPT | Performed by: FAMILY MEDICINE

## 2023-11-27 ASSESSMENT — ENCOUNTER SYMPTOMS
MYALGIAS: 0
HEADACHES: 0
HEARTBURN: 0
DIZZINESS: 0
CONSTIPATION: 0
FREQUENCY: 0
HEMATOCHEZIA: 0
SORE THROAT: 0
NAUSEA: 0
CHILLS: 0
FEVER: 0
PARESTHESIAS: 0
HEMATURIA: 0
SHORTNESS OF BREATH: 0
EYE PAIN: 0
COUGH: 0
JOINT SWELLING: 0
NERVOUS/ANXIOUS: 0
ARTHRALGIAS: 1
PALPITATIONS: 0
BREAST MASS: 0
DIARRHEA: 0
ABDOMINAL PAIN: 0
WEAKNESS: 0
DYSURIA: 0

## 2023-11-27 ASSESSMENT — ACTIVITIES OF DAILY LIVING (ADL): CURRENT_FUNCTION: NO ASSISTANCE NEEDED

## 2023-11-27 NOTE — PATIENT INSTRUCTIONS
Patient Education   Personalized Prevention Plan  You are due for the preventive services outlined below.  Your care team is available to assist you in scheduling these services.  If you have already completed any of these items, please share that information with your care team to update in your medical record.  Health Maintenance Due   Topic Date Due     Asthma Action Plan - yearly  Never done     Hepatitis C Screening  Never done     Cholesterol Lab  07/29/2019     ANNUAL REVIEW OF HM ORDERS  11/21/2023     Osteoporosis Screening  11/23/2023

## 2023-11-27 NOTE — PROGRESS NOTES
"SUBJECTIVE:   Olivia is a 77 year old, presenting for the following:  Wellness Visit (AWV)        11/27/2023    10:10 AM   Additional Questions   Roomed by Brittany BANKS CMA   Accompanied by Self       Are you in the first 12 months of your Medicare coverage?  No    Healthy Habits:     In general, how would you rate your overall health?  Excellent    Frequency of exercise:  4-5 days/week    Duration of exercise:  30-45 minutes    Do you usually eat at least 4 servings of fruit and vegetables a day, include whole grains    & fiber and avoid regularly eating high fat or \"junk\" foods?  Yes    Taking medications regularly:  Yes    Medication side effects:  None    Ability to successfully perform activities of daily living:  No assistance needed    Home Safety:  No safety concerns identified    Hearing Impairment:  No hearing concerns    In the past 6 months, have you been bothered by leaking of urine?  No    In general, how would you rate your overall mental or emotional health?  Good    Additional concerns today:  No    Patient has been doing well.  She continues to be an avid hiker.  She has no new concerns today.  Asthma is well controlled with Advair and albuterol.  She continues with alendronate for osteoporosis.  She had improvement in her bone density at her last DEXA scan.  She started alendronate in 2019.    Today's PHQ-2 Score:       11/27/2023    10:05 AM   PHQ-2 ( 1999 [employer])   Q1: Little interest or pleasure in doing things 0   Q2: Feeling down, depressed or hopeless 0   PHQ-2 Score 0   Q1: Little interest or pleasure in doing things Not at all   Q2: Feeling down, depressed or hopeless Not at all   PHQ-2 Score 0       Have you ever done Advance Care Planning? (For example, a Health Directive, POLST, or a discussion with a medical provider or your loved ones about your wishes): Yes, advance care planning is on file.      Right Ear:      1000 Hz RESPONSE- on Level:   20 db  (Conditioning sound)   1000 Hz: RESPONSE- on " Level:   20 db    2000 Hz: RESPONSE- on Level:   20 db    4000 Hz: RESPONSE- on Level:   20 db     Left Ear:      4000 Hz: RESPONSE- on Level:   20 db    2000 Hz: RESPONSE- on Level:   20 db    1000 Hz: RESPONSE- on Level:   20 db     500 Hz: RESPONSE- on Level:   20 db     Right Ear:    500 Hz: RESPONSE- on Level:   20 db     Hearing Acuity: Pass    Hearing Assessment: normal   Fall risk  Fallen 2 or more times in the past year?: No  Any fall with injury in the past year?: No    Cognitive Screening   1) Repeat 3 items (Leader, Season, Table)    2) Clock draw: NORMAL  3) 3 item recall: Recalls 3 objects  Results: 3 items recalled: COGNITIVE IMPAIRMENT LESS LIKELY    Mini-CogTM Copyright S Leanne. Licensed by the author for use in [facility]; reprinted with permission (ingrid@[email or web address]). All rights reserved.          Reviewed and updated as needed this visit by clinical staff   Tobacco  Allergies  Meds              Reviewed and updated as needed this visit by Provider                 Social History     Tobacco Use    Smoking status: Former     Packs/day: 0.20     Years: 20.00     Additional pack years: 0.00     Total pack years: 4.00     Types: Cigarettes     Start date: 1963     Quit date: 5/10/1984     Years since quittin.5     Passive exposure: Past    Smokeless tobacco: Never   Substance Use Topics    Alcohol use: Yes     Alcohol/week: 1.0 standard drink of alcohol     Types: 1 Glasses of wine per week     Comment: wine with dinner             2023    10:19 AM   Alcohol Use   Prescreen: >3 drinks/day or >7 drinks/week? No          No data to display              Do you have a current opioid prescription? No  Do you use any other controlled substances or medications that are not prescribed by a provider? None      Current providers sharing in care for this patient include:   Patient Care Team:  Josh Silverman MD as PCP - General (Family Medicine)  Josh Silverman MD as Assigned  PCP    The following health maintenance items are reviewed in Epic and correct as of today:  Health Maintenance   Topic Date Due    ASTHMA ACTION PLAN  Never done    HEPATITIS C SCREENING  Never done    LIPID  07/29/2019    ANNUAL REVIEW OF HM ORDERS  11/21/2023    DEXA  11/23/2023    MEDICARE ANNUAL WELLNESS VISIT  11/21/2023    ASTHMA CONTROL TEST  05/27/2024    FALL RISK ASSESSMENT  11/27/2024    DTAP/TDAP/TD IMMUNIZATION (3 - Td or Tdap) 10/09/2027    ADVANCE CARE PLANNING  11/21/2027    PHQ-2 (once per calendar year)  Completed    INFLUENZA VACCINE  Completed    Pneumococcal Vaccine: 65+ Years  Completed    ZOSTER IMMUNIZATION  Completed    RSV VACCINE (Pregnancy & 60+)  Completed    COVID-19 Vaccine  Completed    IPV IMMUNIZATION  Aged Out    HPV IMMUNIZATION  Aged Out    MENINGITIS IMMUNIZATION  Aged Out    RSV MONOCLONAL ANTIBODY  Aged Out             Pertinent mammograms are reviewed under the imaging tab.    Review of Systems   Constitutional:  Negative for chills and fever.   HENT:  Negative for congestion, ear pain, hearing loss and sore throat.    Eyes:  Negative for pain and visual disturbance.   Respiratory:  Negative for cough and shortness of breath.    Cardiovascular:  Negative for chest pain, palpitations and peripheral edema.   Gastrointestinal:  Negative for abdominal pain, constipation, diarrhea, heartburn, hematochezia and nausea.   Breasts:  Negative for tenderness, breast mass and discharge.   Genitourinary:  Negative for dysuria, frequency, genital sores, hematuria, pelvic pain, urgency, vaginal bleeding and vaginal discharge.   Musculoskeletal:  Positive for arthralgias. Negative for joint swelling and myalgias.   Skin:  Negative for rash.   Neurological:  Negative for dizziness, weakness, headaches and paresthesias.   Psychiatric/Behavioral:  Negative for mood changes. The patient is not nervous/anxious.          OBJECTIVE:   BP (!) 142/73 (BP Location: Right arm, Patient Position:  "Sitting, Cuff Size: Adult Regular)   Pulse 67   Temp 97.8  F (36.6  C) (Tympanic)   Resp 16   Ht 1.676 m (5' 6\")   Wt 68.4 kg (150 lb 12.8 oz)   LMP  (LMP Unknown)   SpO2 96%   BMI 24.34 kg/m   Estimated body mass index is 24.34 kg/m  as calculated from the following:    Height as of this encounter: 1.676 m (5' 6\").    Weight as of this encounter: 68.4 kg (150 lb 12.8 oz).  Physical Exam  GENERAL: healthy, alert and no distress  EYES: Eyes grossly normal to inspection, PERRL and conjunctivae and sclerae normal  HENT: ear canals and TM's normal, nose and mouth without ulcers or lesions  NECK: no adenopathy, no asymmetry, masses, or scars and thyroid normal to palpation  RESP: lungs clear to auscultation - no rales, rhonchi or wheezes  CV: regular rate and rhythm, normal S1 S2, no S3 or S4, no murmur, click or rub, no peripheral edema and peripheral pulses strong  ABDOMEN: soft, nontender, no hepatosplenomegaly, no masses and bowel sounds normal  MS: no gross musculoskeletal defects noted, no edema  PSYCH: mentation appears normal, affect normal/bright    Diagnostic Test Results:  Labs reviewed in Epic    ASSESSMENT / PLAN:   Olivia was seen today for wellness visit.    Diagnoses and all orders for this visit:    Encounter for Medicare annual wellness exam    Age-related osteoporosis without current pathological fracture    Primary osteoarthritis of both first carpometacarpal joints    Mild intermittent asthma without complication    Screening for hyperlipidemia  -     Lipid panel reflex to direct LDL Non-fasting; Future    Other orders  -     REVIEW OF HEALTH MAINTENANCE PROTOCOL ORDERS  -     PRIMARY CARE FOLLOW-UP SCHEDULING; Future    Patient is currently doing well.  Plan on repeating DEXA and 1 year.  At that time we will decide on a holiday from alendronate pending results of her scan.  She will continue with current medication for asthma as her condition is well controlled.  Arthritis is manageable with " over-the-counter analgesics.    Patient has been advised of split billing requirements and indicates understanding: Yes      COUNSELING:  Reviewed preventive health counseling, as reflected in patient instructions       Regular exercise       Healthy diet/nutrition       Vision screening       Hearing screening       Dental care       Bladder control       Fall risk prevention       Osteoporosis prevention/bone health        She reports that she quit smoking about 39 years ago. Her smoking use included cigarettes. She started smoking about 60 years ago. She has a 4.00 pack-year smoking history. She has been exposed to tobacco smoke. She has never used smokeless tobacco.      Appropriate preventive services were discussed with this patient, including applicable screening as appropriate for fall prevention, nutrition, physical activity, Tobacco-use cessation, weight loss and cognition.  Checklist reviewing preventive services available has been given to the patient.    Reviewed patients plan of care and provided an AVS. The Basic Care Plan (routine screening as documented in Health Maintenance) for Olivia meets the Care Plan requirement. This Care Plan has been established and reviewed with the Patient.          Josh Silverman MD  [facility]    Identified Health Risks:  I have reviewed Opioid Use Disorder and Substance Use Disorder risk factors and made any needed referrals.

## 2024-01-22 ENCOUNTER — MYC MEDICAL ADVICE (OUTPATIENT)
Dept: FAMILY MEDICINE | Facility: CLINIC | Age: 79
End: 2024-01-22
Payer: MEDICARE

## 2024-01-22 DIAGNOSIS — M81.0 AGE-RELATED OSTEOPOROSIS WITHOUT CURRENT PATHOLOGICAL FRACTURE: ICD-10-CM

## 2024-01-22 RX ORDER — FLUTICASONE PROPIONATE AND SALMETEROL 100; 50 UG/1; UG/1
POWDER RESPIRATORY (INHALATION)
Qty: 180 EACH | Refills: 3 | Status: SHIPPED | OUTPATIENT
Start: 2024-01-22

## 2024-01-22 NOTE — TELEPHONE ENCOUNTER
Routing refill request to provider for review/approval because:  Needs generic form of Advair for insurance coverage     Last Written Prescription Date:  11/14/23  Last Fill Quantity: 180,  # refills: 3   Last office visit: 11/27/2023              
21-Jul-2019 02:40

## 2024-04-24 DIAGNOSIS — J45.20 MILD INTERMITTENT ASTHMA WITHOUT COMPLICATION: ICD-10-CM

## 2024-04-24 RX ORDER — ALBUTEROL SULFATE 90 UG/1
AEROSOL, METERED RESPIRATORY (INHALATION)
Qty: 8.5 G | Refills: 3 | Status: SHIPPED | OUTPATIENT
Start: 2024-04-24

## 2024-07-17 DIAGNOSIS — M81.0 AGE-RELATED OSTEOPOROSIS WITHOUT CURRENT PATHOLOGICAL FRACTURE: ICD-10-CM

## 2024-07-17 RX ORDER — FLUTICASONE PROPIONATE 50 MCG
SPRAY, SUSPENSION (ML) NASAL
Qty: 48 G | Refills: 1 | Status: SHIPPED | OUTPATIENT
Start: 2024-07-17

## 2024-10-09 DIAGNOSIS — J45.20 MILD INTERMITTENT ASTHMA WITHOUT COMPLICATION: ICD-10-CM

## 2024-10-09 RX ORDER — ALENDRONATE SODIUM 70 MG/1
TABLET ORAL
Qty: 13 TABLET | Refills: 3 | Status: SHIPPED | OUTPATIENT
Start: 2024-10-09

## 2024-10-28 ENCOUNTER — PATIENT OUTREACH (OUTPATIENT)
Dept: CARE COORDINATION | Facility: CLINIC | Age: 79
End: 2024-10-28
Payer: MEDICARE

## 2024-10-29 ENCOUNTER — MYC MEDICAL ADVICE (OUTPATIENT)
Dept: FAMILY MEDICINE | Facility: CLINIC | Age: 79
End: 2024-10-29
Payer: MEDICARE

## 2024-10-29 DIAGNOSIS — M81.0 AGE-RELATED OSTEOPOROSIS WITHOUT CURRENT PATHOLOGICAL FRACTURE: Primary | ICD-10-CM

## 2024-10-29 DIAGNOSIS — Z78.0 MENOPAUSE PRESENT: ICD-10-CM

## 2024-10-29 NOTE — TELEPHONE ENCOUNTER
DEXA pended for provider review. Please route back to care team to have order routed to [facility].

## 2024-11-11 ENCOUNTER — PATIENT OUTREACH (OUTPATIENT)
Dept: CARE COORDINATION | Facility: CLINIC | Age: 79
End: 2024-11-11
Payer: MEDICARE

## 2024-11-11 ENCOUNTER — TRANSFERRED RECORDS (OUTPATIENT)
Dept: MULTI SPECIALTY CLINIC | Facility: CLINIC | Age: 79
End: 2024-11-11
Payer: MEDICARE

## 2024-11-29 SDOH — HEALTH STABILITY: PHYSICAL HEALTH: ON AVERAGE, HOW MANY DAYS PER WEEK DO YOU ENGAGE IN MODERATE TO STRENUOUS EXERCISE (LIKE A BRISK WALK)?: 5 DAYS

## 2024-11-29 SDOH — HEALTH STABILITY: PHYSICAL HEALTH: ON AVERAGE, HOW MANY MINUTES DO YOU ENGAGE IN EXERCISE AT THIS LEVEL?: 40 MIN

## 2024-11-29 ASSESSMENT — ASTHMA QUESTIONNAIRES
QUESTION_4 LAST FOUR WEEKS HOW OFTEN HAVE YOU USED YOUR RESCUE INHALER OR NEBULIZER MEDICATION (SUCH AS ALBUTEROL): NOT AT ALL
ACT_TOTALSCORE: 24
QUESTION_3 LAST FOUR WEEKS HOW OFTEN DID YOUR ASTHMA SYMPTOMS (WHEEZING, COUGHING, SHORTNESS OF BREATH, CHEST TIGHTNESS OR PAIN) WAKE YOU UP AT NIGHT OR EARLIER THAN USUAL IN THE MORNING: NOT AT ALL
QUESTION_2 LAST FOUR WEEKS HOW OFTEN HAVE YOU HAD SHORTNESS OF BREATH: NOT AT ALL
ACT_TOTALSCORE: 24
QUESTION_5 LAST FOUR WEEKS HOW WOULD YOU RATE YOUR ASTHMA CONTROL: WELL CONTROLLED
QUESTION_1 LAST FOUR WEEKS HOW MUCH OF THE TIME DID YOUR ASTHMA KEEP YOU FROM GETTING AS MUCH DONE AT WORK, SCHOOL OR AT HOME: NONE OF THE TIME

## 2024-11-29 ASSESSMENT — SOCIAL DETERMINANTS OF HEALTH (SDOH): HOW OFTEN DO YOU GET TOGETHER WITH FRIENDS OR RELATIVES?: TWICE A WEEK

## 2024-12-04 ENCOUNTER — OFFICE VISIT (OUTPATIENT)
Dept: FAMILY MEDICINE | Facility: CLINIC | Age: 79
End: 2024-12-04
Payer: MEDICARE

## 2024-12-04 VITALS
SYSTOLIC BLOOD PRESSURE: 132 MMHG | TEMPERATURE: 98.3 F | RESPIRATION RATE: 16 BRPM | HEIGHT: 66 IN | OXYGEN SATURATION: 99 % | WEIGHT: 150.6 LBS | HEART RATE: 69 BPM | DIASTOLIC BLOOD PRESSURE: 85 MMHG | BODY MASS INDEX: 24.2 KG/M2

## 2024-12-04 DIAGNOSIS — Z71.84 TRAVEL ADVICE ENCOUNTER: ICD-10-CM

## 2024-12-04 DIAGNOSIS — J45.20 MILD INTERMITTENT ASTHMA WITHOUT COMPLICATION: ICD-10-CM

## 2024-12-04 DIAGNOSIS — R22.9 SUBCUTANEOUS MASS: ICD-10-CM

## 2024-12-04 DIAGNOSIS — M81.0 AGE-RELATED OSTEOPOROSIS WITHOUT CURRENT PATHOLOGICAL FRACTURE: ICD-10-CM

## 2024-12-04 DIAGNOSIS — Z13.1 SCREENING FOR DIABETES MELLITUS: ICD-10-CM

## 2024-12-04 DIAGNOSIS — Z00.00 ENCOUNTER FOR MEDICARE ANNUAL WELLNESS EXAM: Primary | ICD-10-CM

## 2024-12-04 PROCEDURE — 11104 PUNCH BX SKIN SINGLE LESION: CPT | Performed by: FAMILY MEDICINE

## 2024-12-04 PROCEDURE — 99214 OFFICE O/P EST MOD 30 MIN: CPT | Mod: 25 | Performed by: FAMILY MEDICINE

## 2024-12-04 PROCEDURE — G0439 PPPS, SUBSEQ VISIT: HCPCS | Performed by: FAMILY MEDICINE

## 2024-12-04 RX ORDER — ATOVAQUONE AND PROGUANIL HYDROCHLORIDE 250; 100 MG/1; MG/1
1 TABLET, FILM COATED ORAL DAILY
Qty: 25 TABLET | Refills: 0 | Status: SHIPPED | OUTPATIENT
Start: 2024-12-04

## 2024-12-04 RX ORDER — NAPROXEN 375 MG/1
375 TABLET ORAL DAILY
COMMUNITY

## 2024-12-04 NOTE — PROGRESS NOTES
Preventive Care Visit  [facility]  Josh Silverman MD, Family Medicine  Dec 4, 2024      Assessment & Plan     Encounter for Medicare annual wellness exam  We have discussed health maintenance, routine follow-up, immunizations, heart healthy diet, regular activity, weight maintenance.    Mild intermittent asthma without complication  Stable, continue with current medication    Age-related osteoporosis without current pathological fracture  Bone density has been increasing over the last several years on her bisphosphonate.  She will take a drug holiday for the next several years.  Recheck bone density in about 2 years    Screening for diabetes mellitus  - Basic metabolic panel; Future    Travel advice encounter  Patient will be traveling in [facility] in March of this year.  She will need malaria prophylaxis.  She does not tolerate mefloquine and is allergic to doxycycline.  - atovaquone-proguanil (MALARONE) 250-100 MG tablet; Take 1 tablet by mouth daily. Start 2 days before travel and continue 7 days after return.    Subcutaneous mass  The skin around the lesion was prepped with Betadine and anesthetized with 1% lidocaine.  A 4 mm punch biopsy was taken from the breast lesion.  Hemostasis obtained with one 4-0 Vicryl suture.  Specimen was sent to pathology.     - Surgical Pathology Exam    Patient has been advised of split billing requirements and indicates understanding: Yes        Counseling  Appropriate preventive services were addressed with this patient via screening, questionnaire, or discussion as appropriate for fall prevention, nutrition, physical activity, Tobacco-use cessation, social engagement, weight loss and cognition.  Checklist reviewing preventive services available has been given to the patient.  Reviewed patient's diet, addressing concerns and/or questions.           Dee Baker is a 78 year old, presenting for the following:  Medicare Visit (AWV)        12/4/2024     10:45 AM   Additional Questions   Roomed by Kalpana         Via the Health Maintenance questionnaire, the patient has reported the following services have been completed DEXA: [facility] 2024-11-11, this information has been sent to the abstraction team.    HPI  Patient is here for Medicare wellness exam.  She has been doing well over the last year.  She plans on traveling to [facility] to visit her daughter in March.  She will need malaria prophylaxis.  Asthma has been well-controlled.  Her bone density has been increasing.  She has been on alendronate.  She would like me to look at 2 skin lesions.  She is wanting to get back left breast.        Health Care Directive  Patient does not have a Health Care Directive: Patient states has Advance Directive and will bring in a copy to clinic.      11/29/2024   General Health   How would you rate your overall physical health? Excellent   Feel stress (tense, anxious, or unable to sleep) Only a little      (!) STRESS CONCERN      11/29/2024   Nutrition   Diet: Regular (no restrictions)            11/29/2024   Exercise   Days per week of moderate/strenous exercise 5 days   Average minutes spent exercising at this level 40 min            11/29/2024   Social Factors   Frequency of gathering with friends or relatives Twice a week   Worry food won't last until get money to buy more No   Food not last or not have enough money for food? No   Do you have housing? (Housing is defined as stable permanent housing and does not include staying ouside in a car, in a tent, in an abandoned building, in an overnight shelter, or couch-surfing.) Yes   Are you worried about losing your housing? No   Lack of transportation? No   Unable to get utilities (heat,electricity)? No            11/29/2024   Fall Risk   Fallen 2 or more times in the past year? No     No    Trouble with walking or balance? No     No        Patient-reported    Multiple values from one day are sorted in reverse-chronological order           2024   Activities of Daily Living- Home Safety   Needs help with the following daily activites None of the above   Safety concerns in the home None of the above            2024   Dental   Dentist two times every year? Yes            2024   Hearing Screening   Hearing concerns? None of the above            2024   Driving Risk Screening   Patient/family members have concerns about driving No            2024   General Alertness/Fatigue Screening   Have you been more tired than usual lately? No            2024   Urinary Incontinence Screening   Bothered by leaking urine in past 6 months No            2024   TB Screening   Were you born outside of the [place]? No            Today's PHQ-2 Score:       2024    10:46 AM   PHQ-2 (  [employer])   Q1: Little interest or pleasure in doing things 0    Q2: Feeling down, depressed or hopeless 0    PHQ-2 Score 0    Q1: Little interest or pleasure in doing things Not at all   Q2: Feeling down, depressed or hopeless Not at all   PHQ-2 Score 0       Patient-reported           2024   Substance Use   Alcohol more than 3/day or more than 7/wk No   Do you have a current opioid prescription? No   How severe/bad is pain from 1 to 10? 0/10 (No Pain)   Do you use any other substances recreationally? No        Social History     Tobacco Use    Smoking status: Former     Current packs/day: 0.00     Average packs/day: 0.2 packs/day for 20.7 years (4.1 ttl pk-yrs)     Types: Cigarettes     Start date: 1963     Quit date: 5/10/1984     Years since quittin.5     Passive exposure: Past    Smokeless tobacco: Never   Vaping Use    Vaping status: Never Used   Substance Use Topics    Alcohol use: Yes     Alcohol/week: 1.0 standard drink of alcohol     Types: 1 Glasses of wine per week     Comment: wine with dinner    Drug use: Never              ASCVD Risk   The ASCVD Risk score (Genesis ROMO, et al., 2019) failed to calculate for  the following reasons:    The valid HDL cholesterol range is 20 to 100 mg/dL            Reviewed and updated as needed this visit by Provider                    Past Medical History:   Diagnosis Date    Arthritis 2006    History of blood transfusion 2005    Uncomplicated asthma 1983     Past Surgical History:   Procedure Laterality Date    BREAST BIOPSY, RT/LT      Benign    CATARACT EXTRACTION EXTRACAPSULAR W/ INTRAOCULAR LENS IMPLANTATION Right 09/21/2017    CATARACT EXTRACTION EXTRACAPSULAR W/ INTRAOCULAR LENS IMPLANTATION Left 10/05/2017    CHOLECYSTECTOMY      COLONOSCOPY  02/08/2006    Diverticulosis; repeat in 3 yrs w/ annual hemoccults    COLONOSCOPY  10/21/2016    Normal; follow up as needed    CYST REMOVAL      HERNIA REPAIR      JOINT REPLACEMENT      LAPAROSCOPIC CHOLECYSTECTOMY W/ CHOLANGIOGRAPHY  06/11/2013    ORTHOPEDIC SURGERY  2005    knee replaced    TONSILLECTOMY      VASCULAR SURGERY      venous ligation    VEIN LIGATION Bilateral     both legs     Labs reviewed in EPIC  BP Readings from Last 3 Encounters:   12/04/24 132/85   11/27/23 (!) 142/73   05/11/23 (!) 148/64    Wt Readings from Last 3 Encounters:   12/04/24 68.3 kg (150 lb 9.6 oz)   11/27/23 68.4 kg (150 lb 12.8 oz)   05/11/23 69.1 kg (152 lb 4.8 oz)                  Current providers sharing in care for this patient include:  Patient Care Team:  Josh Silverman MD as PCP - General (Family Medicine)  Josh Silverman MD as Assigned PCP    The following health maintenance items are reviewed in Epic and correct as of today:  Health Maintenance   Topic Date Due    ASTHMA ACTION PLAN  Never done    HEPATITIS C SCREENING  Never done    LUNG CANCER SCREENING  Never done    GLUCOSE  06/18/2023    ANNUAL REVIEW OF HM ORDERS  11/27/2024    MEDICARE ANNUAL WELLNESS VISIT  11/27/2024    ASTHMA CONTROL TEST  06/04/2025    FALL RISK ASSESSMENT  12/04/2025    DEXA  11/11/2026    DTAP/TDAP/TD IMMUNIZATION (3 - Td or Tdap) 10/09/2027    LIPID   "11/27/2028    ADVANCE CARE PLANNING  11/27/2028    PHQ-2 (once per calendar year)  Completed    INFLUENZA VACCINE  Completed    Pneumococcal Vaccine: 65+ Years  Completed    ZOSTER IMMUNIZATION  Completed    RSV VACCINE  Completed    COVID-19 Vaccine  Completed    HPV IMMUNIZATION  Aged Out    MENINGITIS IMMUNIZATION  Aged Out    RSV MONOCLONAL ANTIBODY  Aged Out         Review of Systems  Constitutional, neuro, ENT, endocrine, pulmonary, cardiac, gastrointestinal, genitourinary, musculoskeletal, integument and psychiatric systems are negative, except as otherwise noted.     Objective    Exam  /85   Pulse 69   Temp 98.3  F (36.8  C) (Tympanic)   Resp 16   Ht 1.676 m (5' 6\")   Wt 68.3 kg (150 lb 9.6 oz)   LMP  (LMP Unknown)   SpO2 99%   BMI 24.31 kg/m     Estimated body mass index is 24.31 kg/m  as calculated from the following:    Height as of this encounter: 1.676 m (5' 6\").    Weight as of this encounter: 68.3 kg (150 lb 9.6 oz).    Physical Exam  GENERAL: alert and no distress  EYES: Eyes grossly normal to inspection, PERRL and conjunctivae and sclerae normal  HENT: ear canals and TM's normal, nose and mouth without ulcers or lesions  NECK: no adenopathy, no asymmetry, masses, or scars  RESP: lungs clear to auscultation - no rales, rhonchi or wheezes  CV: regular rate and rhythm, normal S1 S2, no S3 or S4, no murmur, click or rub, no peripheral edema  ABDOMEN: soft, nontender, no hepatosplenomegaly, no masses and bowel sounds normal  MS: no gross musculoskeletal defects noted, no edema  SKIN: There is a 6 mm epidermal inclusion cyst with mild inflammation in the mid back.  There is a 20 mm, irregular, firm, nontender, yellowish subcutaneous mass in the lateral left breast  NEURO: Normal strength and tone, mentation intact and speech normal  PSYCH: mentation appears normal, affect normal/bright        12/4/2024   Mini Cog   Clock Draw Score 2 Normal   3 Item Recall 3 objects recalled   Mini Cog Total " Score 5                 Signed Electronically by: Josh Silverman MD

## 2025-02-12 DIAGNOSIS — M81.0 AGE-RELATED OSTEOPOROSIS WITHOUT CURRENT PATHOLOGICAL FRACTURE: ICD-10-CM

## 2025-02-13 RX ORDER — FLUTICASONE PROPIONATE AND SALMETEROL 100; 50 UG/1; UG/1
POWDER RESPIRATORY (INHALATION)
Qty: 180 EACH | Refills: 3 | Status: SHIPPED | OUTPATIENT
Start: 2025-02-13

## 2025-02-13 RX ORDER — FLUTICASONE PROPIONATE 50 MCG
SPRAY, SUSPENSION (ML) NASAL
Qty: 16 G | Refills: 1 | Status: SHIPPED | OUTPATIENT
Start: 2025-02-13

## 2025-05-13 DIAGNOSIS — M81.0 AGE-RELATED OSTEOPOROSIS WITHOUT CURRENT PATHOLOGICAL FRACTURE: ICD-10-CM

## 2025-05-13 RX ORDER — FLUTICASONE PROPIONATE 50 MCG
SPRAY, SUSPENSION (ML) NASAL
Qty: 32 G | Refills: 3 | Status: SHIPPED | OUTPATIENT
Start: 2025-05-13